# Patient Record
Sex: MALE | Race: WHITE
[De-identification: names, ages, dates, MRNs, and addresses within clinical notes are randomized per-mention and may not be internally consistent; named-entity substitution may affect disease eponyms.]

---

## 2021-04-19 ENCOUNTER — HOSPITAL ENCOUNTER (OUTPATIENT)
Dept: HOSPITAL 79 - NM | Age: 71
End: 2021-04-19
Payer: MEDICARE

## 2021-04-19 DIAGNOSIS — I20.8: ICD-10-CM

## 2021-04-19 DIAGNOSIS — R06.02: Primary | ICD-10-CM

## 2021-04-27 ENCOUNTER — HOSPITAL ENCOUNTER (OUTPATIENT)
Dept: HOSPITAL 79 - NM | Age: 71
End: 2021-04-27
Payer: MEDICARE

## 2021-04-27 DIAGNOSIS — I45.10: ICD-10-CM

## 2021-04-27 DIAGNOSIS — I20.8: Primary | ICD-10-CM

## 2021-04-27 DIAGNOSIS — I51.89: ICD-10-CM

## 2021-04-27 DIAGNOSIS — R94.39: ICD-10-CM

## 2021-04-27 DIAGNOSIS — I49.3: ICD-10-CM

## 2021-04-27 PROCEDURE — A9502 TC99M TETROFOSMIN: HCPCS

## 2021-08-25 ENCOUNTER — HOSPITAL ENCOUNTER (OUTPATIENT)
Dept: HOSPITAL 79 - CATH | Age: 71
Discharge: HOME | End: 2021-08-25
Attending: INTERNAL MEDICINE
Payer: MEDICARE

## 2021-08-25 DIAGNOSIS — I47.2: ICD-10-CM

## 2021-08-25 DIAGNOSIS — E78.5: ICD-10-CM

## 2021-08-25 DIAGNOSIS — Z98.61: ICD-10-CM

## 2021-08-25 DIAGNOSIS — I08.3: ICD-10-CM

## 2021-08-25 DIAGNOSIS — I42.9: ICD-10-CM

## 2021-08-25 DIAGNOSIS — J98.4: ICD-10-CM

## 2021-08-25 DIAGNOSIS — I25.2: ICD-10-CM

## 2021-08-25 DIAGNOSIS — I48.91: ICD-10-CM

## 2021-08-25 DIAGNOSIS — I25.118: Primary | ICD-10-CM

## 2021-08-25 DIAGNOSIS — I50.9: ICD-10-CM

## 2021-08-25 DIAGNOSIS — Z95.1: ICD-10-CM

## 2021-08-25 DIAGNOSIS — Z20.822: ICD-10-CM

## 2021-08-25 DIAGNOSIS — I11.0: ICD-10-CM

## 2021-08-25 DIAGNOSIS — I27.20: ICD-10-CM

## 2021-08-25 PROCEDURE — U0002 COVID-19 LAB TEST NON-CDC: HCPCS

## 2022-06-26 ENCOUNTER — HOSPITAL ENCOUNTER (EMERGENCY)
Dept: HOSPITAL 79 - ER1 | Age: 72
Discharge: HOME | End: 2022-06-26
Payer: MEDICARE

## 2022-06-26 DIAGNOSIS — I10: ICD-10-CM

## 2022-06-26 DIAGNOSIS — Z95.1: ICD-10-CM

## 2022-06-26 DIAGNOSIS — Z87.442: ICD-10-CM

## 2022-06-26 DIAGNOSIS — N13.2: Primary | ICD-10-CM

## 2022-06-26 LAB
BUN/CREATININE RATIO: 20 (ref 0–10)
HGB BLD-MCNC: 16.7 GM/DL (ref 14–17.5)
RED BLOOD COUNT: 5.25 M/UL (ref 4.2–5.5)
WHITE BLOOD COUNT: 8.1 K/UL (ref 4.5–11)

## 2022-06-27 ENCOUNTER — OFFICE VISIT (OUTPATIENT)
Dept: UROLOGY | Facility: CLINIC | Age: 72
End: 2022-06-27

## 2022-06-27 VITALS — HEIGHT: 72 IN | WEIGHT: 185 LBS | BODY MASS INDEX: 25.06 KG/M2

## 2022-06-27 DIAGNOSIS — N21.1 CALCULUS IN URETHRA: Primary | ICD-10-CM

## 2022-06-27 DIAGNOSIS — N20.0 NEPHROLITHIASIS: ICD-10-CM

## 2022-06-27 LAB
BILIRUB BLD-MCNC: NEGATIVE MG/DL
CLARITY, POC: CLEAR
COLOR UR: YELLOW
EXPIRATION DATE: NORMAL
GLUCOSE UR STRIP-MCNC: NEGATIVE MG/DL
KETONES UR QL: NEGATIVE
LEUKOCYTE EST, POC: NEGATIVE
Lab: NORMAL
NITRITE UR-MCNC: NEGATIVE MG/ML
PH UR: 6 [PH] (ref 5–8)
PROT UR STRIP-MCNC: NEGATIVE MG/DL
RBC # UR STRIP: NEGATIVE /UL
SP GR UR: 1.01 (ref 1–1.03)
UROBILINOGEN UR QL: NORMAL

## 2022-06-27 PROCEDURE — 96372 THER/PROPH/DIAG INJ SC/IM: CPT | Performed by: UROLOGY

## 2022-06-27 PROCEDURE — 81003 URINALYSIS AUTO W/O SCOPE: CPT | Performed by: UROLOGY

## 2022-06-27 PROCEDURE — 87086 URINE CULTURE/COLONY COUNT: CPT | Performed by: UROLOGY

## 2022-06-27 PROCEDURE — 99204 OFFICE O/P NEW MOD 45 MIN: CPT | Performed by: UROLOGY

## 2022-06-27 RX ORDER — CEFAZOLIN SODIUM 2 G/50ML
2 SOLUTION INTRAVENOUS ONCE
Status: CANCELLED | OUTPATIENT
Start: 2022-06-27 | End: 2022-06-27

## 2022-06-27 RX ORDER — HYDROCODONE BITARTRATE AND ACETAMINOPHEN 5; 325 MG/1; MG/1
TABLET ORAL
COMMUNITY
Start: 2022-06-26

## 2022-06-27 RX ORDER — IBUPROFEN 800 MG/1
800 TABLET ORAL EVERY 8 HOURS PRN
Qty: 30 TABLET | Refills: 0 | Status: SHIPPED | OUTPATIENT
Start: 2022-06-27

## 2022-06-27 RX ORDER — METOPROLOL SUCCINATE 100 MG/1
100 TABLET, EXTENDED RELEASE ORAL DAILY
COMMUNITY
Start: 2022-05-03

## 2022-06-27 RX ORDER — ASPIRIN 325 MG
325 TABLET ORAL DAILY
COMMUNITY

## 2022-06-27 RX ORDER — KETOROLAC TROMETHAMINE 30 MG/ML
30 INJECTION, SOLUTION INTRAMUSCULAR; INTRAVENOUS ONCE
Status: COMPLETED | OUTPATIENT
Start: 2022-06-27 | End: 2022-06-27

## 2022-06-27 RX ORDER — ATORVASTATIN CALCIUM 40 MG/1
40 TABLET, FILM COATED ORAL DAILY
COMMUNITY
Start: 2022-05-03

## 2022-06-27 RX ORDER — LISINOPRIL 40 MG/1
40 TABLET ORAL DAILY
COMMUNITY
Start: 2022-05-03

## 2022-06-27 RX ORDER — NITROFURANTOIN 25; 75 MG/1; MG/1
CAPSULE ORAL
COMMUNITY
Start: 2022-06-27 | End: 2022-07-05

## 2022-06-27 RX ORDER — AMLODIPINE BESYLATE 5 MG/1
5 TABLET ORAL DAILY
COMMUNITY
Start: 2022-05-03

## 2022-06-27 RX ADMIN — KETOROLAC TROMETHAMINE 30 MG: 30 INJECTION, SOLUTION INTRAMUSCULAR; INTRAVENOUS at 16:04

## 2022-06-27 NOTE — PROGRESS NOTES
Office Visit New Urology      Patient Name: Diallo Chun  : 1950   MRN: [id number]     Chief Complaint:    Chief Complaint   Patient presents with   • Nephrolithiasis       Referring Provider: Referring, Self    History of Present Illness: Diallo Chun is a 71 y.o. male who presents to Urology today for right sided kidney stone.  He reports his pain started last Wednesday and his pain would come and go.  He went to urgent care on Friday and was given some antibiotics.  He then went to the ER yesterday where he had a CT scan that showed a 5 mm stone in the proximal right ureter.  He has had stone before many years ago when he lived in [place].  He was able to pass those stones.  No dysuria or gross hematuria.    No family history of kidney stones.    Today he reports his pain has improved.  He is not having any dysuria or gross hematuria.  No fever, chills, nausea, or vomiting.    Subjective      Review of System: Review of Systems   Constitutional: Positive for fatigue. Negative for chills and fever.   Respiratory: Negative for cough, shortness of breath and wheezing.    Cardiovascular: Negative for leg swelling.   Gastrointestinal: Positive for nausea. Negative for abdominal pain and vomiting.   Genitourinary: Positive for flank pain.   Musculoskeletal: Positive for back pain. Negative for joint swelling.   Neurological: Negative for dizziness and headaches.   Psychiatric/Behavioral: Negative for confusion.      I have reviewed the ROS documented by my clinical staff, I have updated appropriately and I agree. Letty Flores MD    Past Medical History:   Past Medical History:   Diagnosis Date   • Blockage of coronary artery of heart (HCC)    • Hypertension        Past Surgical History:   Past Surgical History:   Procedure Laterality Date   • CARDIAC SURGERY      5 bypass       Family History:   Family History   Problem Relation Age of Onset   • Diabetes Father        Social  "History:   Social History     Socioeconomic History   • Marital status:    Tobacco Use   • Smoking status: Former Smoker   • Smokeless tobacco: Never Used   Vaping Use   • Vaping Use: Never used   Substance and Sexual Activity   • Alcohol use: Never   • Drug use: Never       Medications:     Current Outpatient Medications:   •  aspirin 325 MG tablet, Take 325 mg by mouth Daily., Disp: , Rfl:   •  amLODIPine (NORVASC) 5 MG tablet, Take 5 mg by mouth Daily., Disp: , Rfl:   •  atorvastatin (LIPITOR) 40 MG tablet, Take 40 mg by mouth Daily., Disp: , Rfl:   •  HYDROcodone-acetaminophen (NORCO) 5-325 MG per tablet, , Disp: , Rfl:   •  ibuprofen (ADVIL,MOTRIN) 800 MG tablet, Take 1 tablet by mouth Every 8 (Eight) Hours As Needed for Moderate Pain ., Disp: 30 tablet, Rfl: 0  •  lisinopril (PRINIVIL,ZESTRIL) 40 MG tablet, Take 40 mg by mouth Daily., Disp: , Rfl:   •  metoprolol succinate XL (TOPROL-XL) 100 MG 24 hr tablet, Take 100 mg by mouth Daily., Disp: , Rfl:   •  nitrofurantoin, macrocrystal-monohydrate, (MACROBID) 100 MG capsule, , Disp: , Rfl:     Allergies:   No Known Allergies      Objective     Physical Exam:   Vital Signs:   Vitals:    06/27/22 1519   Weight: 83.9 kg (185 lb)   Height: 182.9 cm (72\")     Body mass index is 25.09 kg/m².     Physical Exam  Vitals and nursing note reviewed.   Constitutional:       General: He is awake. He is not in acute distress.     Appearance: Normal appearance. He is well-developed. He is obese.   HENT:      Head: Normocephalic and atraumatic.      Right Ear: External ear normal.      Left Ear: External ear normal.      Nose: Nose normal.   Eyes:      Conjunctiva/sclera: Conjunctivae normal.   Pulmonary:      Effort: Pulmonary effort is normal.   Abdominal:      General: There is no distension.      Palpations: Abdomen is soft. There is no mass.      Tenderness: There is no abdominal tenderness. There is no right CVA tenderness, left CVA tenderness, guarding or rebound. "      Hernia: No hernia is present. There is no hernia in the left inguinal area or right inguinal area.   Genitourinary:     Pubic Area: No rash.       Penis: Normal.       Testes: Normal.      Prostate: Normal.      Rectum: Normal. No mass or tenderness. Normal anal tone.   Musculoskeletal:      Cervical back: Normal range of motion.   Lymphadenopathy:      Lower Body: No right inguinal adenopathy. No left inguinal adenopathy.   Skin:     General: Skin is warm.   Neurological:      General: No focal deficit present.      Mental Status: He is alert and oriented to person, place, and time.   Psychiatric:         Behavior: Behavior normal. Behavior is cooperative.         Labs:   Brief Urine Lab Results  (Last result in the past 365 days)      Color   Clarity   Blood   Leuk Est   Nitrite   Protein   CREAT   Urine HCG        06/27/22 1538 Yellow   Clear   Negative   Negative   Negative   Negative                      No results found for: GLUCOSE, CALCIUM, NA, K, CO2, CL, BUN, CREATININE, EGFRIFAFRI, EGFRIFNONA, BCR, ANIONGAP    No results found for: WBC, HGB, HCT, MCV, PLT    No results found for: PSA     Images:   No Images in the past 120 days found..    Measures:   Tobacco:   Diallo Chun  reports that he has quit smoking. He has never used smokeless tobacco..    Urine Incontinence: Patient reports that he is not currently experiencing any symptoms of urinary incontinence.      Assessment / Plan      Assessment/Plan:   Diallo Chun is a 71 y.o. male who presented today for right 5 mm proximal ureteral stone.  We were unable to open his imaging today but based on his report he has a 5 mm right proximal stone with hydronephrosis.   I discussed his options with him today which included medical expulsive therapy versus ureteroscopy and laser lithotripsy. I discussed the risks and benefits of each.  His pain has improved some as of today.  He would like to see if he can pass his stone.  We will schedule  him for July 7th for ureteroscopy and laser lithotripsy.  I advised them should he have any fever, nausea, vomiting, worsening or uncontrolled pain he needs to present back to the ER immediately.      Diagnoses and all orders for this visit:    1. Calculus in urethra (Primary)  -     POC Urinalysis Dipstick, Automated  -     Case Request; Standing  -     COVID PRE-OP / PRE-PROCEDURE SCREENING ORDER (NO ISOLATION) - Swab, Nasopharynx; Future  -     Urinalysis With Culture If Indicated -; Future  -     Case Request    2. Nephrolithiasis  -     ibuprofen (ADVIL,MOTRIN) 800 MG tablet; Take 1 tablet by mouth Every 8 (Eight) Hours As Needed for Moderate Pain .  Dispense: 30 tablet; Refill: 0    Other orders  -     Follow Anesthesia Guidelines / Standing Orders; Future  -     Provide NPO Instructions to Patient; Future  -     Chlorhexidine Skin Prep; Future         Patient Education:       Follow Up:   Return for Follow up after surgery.    I spent approximately 45 minutes providing clinical care for this patient; including review of patient's chart and provider documentation, face to face time spent with patient in examination room (obtaining history, performing physical exam, discussing diagnosis and management options), placing orders, and completing patient documentation.     Letty Flores MD  [facility]

## 2022-06-28 ENCOUNTER — TELEPHONE (OUTPATIENT)
Dept: UROLOGY | Facility: CLINIC | Age: 72
End: 2022-06-28

## 2022-06-28 DIAGNOSIS — N20.0 NEPHROLITHIASIS: Primary | ICD-10-CM

## 2022-06-28 LAB — BACTERIA SPEC AEROBE CULT: NO GROWTH

## 2022-06-28 RX ORDER — TAMSULOSIN HYDROCHLORIDE 0.4 MG/1
1 CAPSULE ORAL DAILY
Qty: 30 CAPSULE | Refills: 0 | Status: SHIPPED | OUTPATIENT
Start: 2022-06-28 | End: 2022-11-07

## 2022-06-28 NOTE — TELEPHONE ENCOUNTER
The patient stated he was suppose to have Flomax or one similar called in yesterday but it wasn't. [facility]

## 2022-07-01 NOTE — DISCHARGE INSTRUCTIONS

## 2022-07-05 ENCOUNTER — TELEPHONE (OUTPATIENT)
Dept: UROLOGY | Facility: CLINIC | Age: 72
End: 2022-07-05

## 2022-07-05 ENCOUNTER — PRE-ADMISSION TESTING (OUTPATIENT)
Dept: PREADMISSION TESTING | Facility: HOSPITAL | Age: 72
End: 2022-07-05

## 2022-07-05 ENCOUNTER — LAB (OUTPATIENT)
Dept: LAB | Facility: HOSPITAL | Age: 72
End: 2022-07-05

## 2022-07-05 DIAGNOSIS — N21.1 CALCULUS IN URETHRA: ICD-10-CM

## 2022-07-05 LAB
ANION GAP SERPL CALCULATED.3IONS-SCNC: 11.7 MMOL/L (ref 5–15)
BILIRUB UR QL STRIP: NEGATIVE
BUN SERPL-MCNC: 23 MG/DL (ref 8–23)
BUN/CREAT SERPL: 23.7 (ref 7–25)
CALCIUM SPEC-SCNC: 9.9 MG/DL (ref 8.6–10.5)
CHLORIDE SERPL-SCNC: 103 MMOL/L (ref 98–107)
CLARITY UR: CLEAR
CO2 SERPL-SCNC: 26.3 MMOL/L (ref 22–29)
COLOR UR: YELLOW
CREAT SERPL-MCNC: 0.97 MG/DL (ref 0.76–1.27)
DEPRECATED RDW RBC AUTO: 40.1 FL (ref 37–54)
EGFRCR SERPLBLD CKD-EPI 2021: 84 ML/MIN/1.73
ERYTHROCYTE [DISTWIDTH] IN BLOOD BY AUTOMATED COUNT: 12.3 % (ref 12.3–15.4)
GLUCOSE SERPL-MCNC: 97 MG/DL (ref 65–99)
GLUCOSE UR STRIP-MCNC: NEGATIVE MG/DL
HCT VFR BLD AUTO: 44.7 % (ref 37.5–51)
HGB BLD-MCNC: 15.4 G/DL (ref 13–17.7)
HGB UR QL STRIP.AUTO: NEGATIVE
KETONES UR QL STRIP: NEGATIVE
LEUKOCYTE ESTERASE UR QL STRIP.AUTO: NEGATIVE
MCH RBC QN AUTO: 30.9 PG (ref 26.6–33)
MCHC RBC AUTO-ENTMCNC: 34.5 G/DL (ref 31.5–35.7)
MCV RBC AUTO: 89.6 FL (ref 79–97)
NITRITE UR QL STRIP: NEGATIVE
PH UR STRIP.AUTO: 6 [PH] (ref 5–8)
PLATELET # BLD AUTO: 239 10*3/MM3 (ref 140–450)
PMV BLD AUTO: 10 FL (ref 6–12)
POTASSIUM SERPL-SCNC: 4.4 MMOL/L (ref 3.5–5.2)
PROT UR QL STRIP: NEGATIVE
RBC # BLD AUTO: 4.99 10*6/MM3 (ref 4.14–5.8)
SARS-COV-2 RNA PNL SPEC NAA+PROBE: NOT DETECTED
SODIUM SERPL-SCNC: 141 MMOL/L (ref 136–145)
SP GR UR STRIP: 1.02 (ref 1–1.03)
UROBILINOGEN UR QL STRIP: NORMAL
WBC NRBC COR # BLD: 6.49 10*3/MM3 (ref 3.4–10.8)

## 2022-07-05 PROCEDURE — 36415 COLL VENOUS BLD VENIPUNCTURE: CPT

## 2022-07-05 PROCEDURE — 80048 BASIC METABOLIC PNL TOTAL CA: CPT

## 2022-07-05 PROCEDURE — 85027 COMPLETE CBC AUTOMATED: CPT

## 2022-07-05 PROCEDURE — C9803 HOPD COVID-19 SPEC COLLECT: HCPCS

## 2022-07-05 PROCEDURE — U0004 COV-19 TEST NON-CDC HGH THRU: HCPCS

## 2022-07-05 PROCEDURE — 81003 URINALYSIS AUTO W/O SCOPE: CPT

## 2022-07-05 NOTE — TELEPHONE ENCOUNTER
I called and left the pt a message that if he has not passed the stone that it would eventually flare back up and possibly become obstructing. And to call me back with his decision.

## 2022-07-05 NOTE — TELEPHONE ENCOUNTER
Caller: Diallo Chun    Relationship to patient: Self    Best call back number: [phone]    Patient is needing: PATIENT HAS NOT HAD ANY PAIN FOR 2 DAYS AND HAS NOT HAD TO TAKE ANY PAIN MEDICATION. HE HAS NOT PASSED THE STONE AND HAS BEEN USING THE STRAINER. PATIENT WANTED TO TOUCH BASE AND SEE IF THE PREOP AND SURGERY WAS NECESSARY STILL.     HUB ATTEMPTED TO WARM TRANSFER BUT WAS UNABLE TO REACH PRACTICE.     PATIENT REQUESTING A CALL BACK ON THE MOBILE NUMBER.

## 2022-07-06 ENCOUNTER — ANESTHESIA EVENT (OUTPATIENT)
Dept: PERIOP | Facility: HOSPITAL | Age: 72
End: 2022-07-06

## 2022-07-07 ENCOUNTER — APPOINTMENT (OUTPATIENT)
Dept: GENERAL RADIOLOGY | Facility: HOSPITAL | Age: 72
End: 2022-07-07

## 2022-07-07 ENCOUNTER — HOSPITAL ENCOUNTER (OUTPATIENT)
Facility: HOSPITAL | Age: 72
Discharge: HOME | End: 2022-07-07
Attending: UROLOGY | Admitting: UROLOGY

## 2022-07-07 ENCOUNTER — ANESTHESIA (OUTPATIENT)
Dept: PERIOP | Facility: HOSPITAL | Age: 72
End: 2022-07-07

## 2022-07-07 VITALS
SYSTOLIC BLOOD PRESSURE: 148 MMHG | WEIGHT: 182.2 LBS | DIASTOLIC BLOOD PRESSURE: 80 MMHG | TEMPERATURE: 97.5 F | HEIGHT: 72 IN | OXYGEN SATURATION: 98 % | HEART RATE: 61 BPM | BODY MASS INDEX: 24.68 KG/M2 | RESPIRATION RATE: 18 BRPM

## 2022-07-07 DIAGNOSIS — N21.1 CALCULUS IN URETHRA: ICD-10-CM

## 2022-07-07 PROCEDURE — 25010000002 GENTAMICIN PER 80 MG: Performed by: UROLOGY

## 2022-07-07 PROCEDURE — 76000 FLUOROSCOPY <1 HR PHYS/QHP: CPT | Performed by: RADIOLOGY

## 2022-07-07 PROCEDURE — 82365 CALCULUS SPECTROSCOPY: CPT | Performed by: UROLOGY

## 2022-07-07 PROCEDURE — A9270 NON-COVERED ITEM OR SERVICE: HCPCS | Performed by: UROLOGY

## 2022-07-07 PROCEDURE — C2617 STENT, NON-COR, TEM W/O DEL: HCPCS | Performed by: UROLOGY

## 2022-07-07 PROCEDURE — C1769 GUIDE WIRE: HCPCS | Performed by: UROLOGY

## 2022-07-07 PROCEDURE — 87086 URINE CULTURE/COLONY COUNT: CPT | Performed by: UROLOGY

## 2022-07-07 PROCEDURE — 25010000002 PROPOFOL 10 MG/ML EMULSION: Performed by: NURSE ANESTHETIST, CERTIFIED REGISTERED

## 2022-07-07 PROCEDURE — C2627 CATH, SUPRAPUBIC/CYSTOSCOPIC: HCPCS | Performed by: UROLOGY

## 2022-07-07 PROCEDURE — 99024 POSTOP FOLLOW-UP VISIT: CPT | Performed by: UROLOGY

## 2022-07-07 PROCEDURE — 63710000001 OPIUM-BELLADONNA 16.2-30 MG SUPPOSITORY: Performed by: UROLOGY

## 2022-07-07 PROCEDURE — 76000 FLUOROSCOPY <1 HR PHYS/QHP: CPT

## 2022-07-07 PROCEDURE — 25010000002 FENTANYL CITRATE (PF) 50 MCG/ML SOLUTION: Performed by: NURSE ANESTHETIST, CERTIFIED REGISTERED

## 2022-07-07 PROCEDURE — 25010000002 CEFAZOLIN PER 500 MG: Performed by: UROLOGY

## 2022-07-07 PROCEDURE — 25010000002 MIDAZOLAM PER 1 MG: Performed by: NURSE ANESTHETIST, CERTIFIED REGISTERED

## 2022-07-07 PROCEDURE — 25010000002 IOPAMIDOL 61 % SOLUTION: Performed by: UROLOGY

## 2022-07-07 PROCEDURE — 52356 CYSTO/URETERO W/LITHOTRIPSY: CPT | Performed by: UROLOGY

## 2022-07-07 PROCEDURE — 25010000002 ONDANSETRON PER 1 MG: Performed by: NURSE ANESTHETIST, CERTIFIED REGISTERED

## 2022-07-07 DEVICE — URETERAL STENT
Type: IMPLANTABLE DEVICE | Site: URETER | Status: FUNCTIONAL
Brand: POLARIS™ ULTRA

## 2022-07-07 RX ORDER — CEFADROXIL 500 MG/1
500 CAPSULE ORAL 2 TIMES DAILY
Qty: 10 CAPSULE | Refills: 0 | Status: SHIPPED | OUTPATIENT
Start: 2022-07-07 | End: 2022-07-25

## 2022-07-07 RX ORDER — OXYCODONE HYDROCHLORIDE AND ACETAMINOPHEN 5; 325 MG/1; MG/1
1 TABLET ORAL ONCE AS NEEDED
Status: DISCONTINUED | OUTPATIENT
Start: 2022-07-07 | End: 2022-07-07 | Stop reason: HOSPADM

## 2022-07-07 RX ORDER — MAGNESIUM HYDROXIDE 1200 MG/15ML
LIQUID ORAL AS NEEDED
Status: DISCONTINUED | OUTPATIENT
Start: 2022-07-07 | End: 2022-07-07 | Stop reason: HOSPADM

## 2022-07-07 RX ORDER — ONDANSETRON 2 MG/ML
INJECTION INTRAMUSCULAR; INTRAVENOUS AS NEEDED
Status: DISCONTINUED | OUTPATIENT
Start: 2022-07-07 | End: 2022-07-07 | Stop reason: SURG

## 2022-07-07 RX ORDER — FENTANYL CITRATE 50 UG/ML
INJECTION, SOLUTION INTRAMUSCULAR; INTRAVENOUS AS NEEDED
Status: DISCONTINUED | OUTPATIENT
Start: 2022-07-07 | End: 2022-07-07 | Stop reason: SURG

## 2022-07-07 RX ORDER — MIDAZOLAM HYDROCHLORIDE 1 MG/ML
INJECTION INTRAMUSCULAR; INTRAVENOUS AS NEEDED
Status: DISCONTINUED | OUTPATIENT
Start: 2022-07-07 | End: 2022-07-07 | Stop reason: SURG

## 2022-07-07 RX ORDER — CEFAZOLIN SODIUM 2 G/50ML
2 SOLUTION INTRAVENOUS ONCE
Status: DISCONTINUED | OUTPATIENT
Start: 2022-07-07 | End: 2022-07-07

## 2022-07-07 RX ORDER — SODIUM CHLORIDE 0.9 % (FLUSH) 0.9 %
10 SYRINGE (ML) INJECTION EVERY 12 HOURS SCHEDULED
Status: DISCONTINUED | OUTPATIENT
Start: 2022-07-07 | End: 2022-07-07 | Stop reason: HOSPADM

## 2022-07-07 RX ORDER — SODIUM CHLORIDE, SODIUM LACTATE, POTASSIUM CHLORIDE, CALCIUM CHLORIDE 600; 310; 30; 20 MG/100ML; MG/100ML; MG/100ML; MG/100ML
100 INJECTION, SOLUTION INTRAVENOUS ONCE AS NEEDED
Status: DISCONTINUED | OUTPATIENT
Start: 2022-07-07 | End: 2022-07-07 | Stop reason: HOSPADM

## 2022-07-07 RX ORDER — SODIUM CHLORIDE 0.9 % (FLUSH) 0.9 %
10 SYRINGE (ML) INJECTION AS NEEDED
Status: DISCONTINUED | OUTPATIENT
Start: 2022-07-07 | End: 2022-07-07 | Stop reason: HOSPADM

## 2022-07-07 RX ORDER — IPRATROPIUM BROMIDE AND ALBUTEROL SULFATE 2.5; .5 MG/3ML; MG/3ML
3 SOLUTION RESPIRATORY (INHALATION) ONCE AS NEEDED
Status: DISCONTINUED | OUTPATIENT
Start: 2022-07-07 | End: 2022-07-07 | Stop reason: HOSPADM

## 2022-07-07 RX ORDER — PHENAZOPYRIDINE HYDROCHLORIDE 200 MG/1
200 TABLET, FILM COATED ORAL 3 TIMES DAILY PRN
Qty: 20 TABLET | Refills: 0 | Status: SHIPPED | OUTPATIENT
Start: 2022-07-07

## 2022-07-07 RX ORDER — LIDOCAINE HYDROCHLORIDE 20 MG/ML
INJECTION, SOLUTION INFILTRATION; PERINEURAL AS NEEDED
Status: DISCONTINUED | OUTPATIENT
Start: 2022-07-07 | End: 2022-07-07 | Stop reason: SURG

## 2022-07-07 RX ORDER — SODIUM CHLORIDE, SODIUM LACTATE, POTASSIUM CHLORIDE, CALCIUM CHLORIDE 600; 310; 30; 20 MG/100ML; MG/100ML; MG/100ML; MG/100ML
INJECTION, SOLUTION INTRAVENOUS CONTINUOUS PRN
Status: DISCONTINUED | OUTPATIENT
Start: 2022-07-07 | End: 2022-07-07 | Stop reason: SURG

## 2022-07-07 RX ORDER — GENTAMICIN SULFATE 40 MG/ML
INJECTION, SOLUTION INTRAMUSCULAR; INTRAVENOUS AS NEEDED
Status: DISCONTINUED | OUTPATIENT
Start: 2022-07-07 | End: 2022-07-07 | Stop reason: HOSPADM

## 2022-07-07 RX ORDER — ATROPA BELLADONNA AND OPIUM 16.2; 3 MG/1; MG/1
SUPPOSITORY RECTAL AS NEEDED
Status: DISCONTINUED | OUTPATIENT
Start: 2022-07-07 | End: 2022-07-07 | Stop reason: HOSPADM

## 2022-07-07 RX ORDER — KETOROLAC TROMETHAMINE 30 MG/ML
15 INJECTION, SOLUTION INTRAMUSCULAR; INTRAVENOUS EVERY 6 HOURS PRN
Status: DISCONTINUED | OUTPATIENT
Start: 2022-07-07 | End: 2022-07-07 | Stop reason: HOSPADM

## 2022-07-07 RX ORDER — MEPERIDINE HYDROCHLORIDE 25 MG/ML
12.5 INJECTION INTRAMUSCULAR; INTRAVENOUS; SUBCUTANEOUS
Status: DISCONTINUED | OUTPATIENT
Start: 2022-07-07 | End: 2022-07-07 | Stop reason: HOSPADM

## 2022-07-07 RX ORDER — FAMOTIDINE 10 MG/ML
INJECTION, SOLUTION INTRAVENOUS AS NEEDED
Status: DISCONTINUED | OUTPATIENT
Start: 2022-07-07 | End: 2022-07-07 | Stop reason: SURG

## 2022-07-07 RX ORDER — MIDAZOLAM HYDROCHLORIDE 1 MG/ML
0.5 INJECTION INTRAMUSCULAR; INTRAVENOUS
Status: DISCONTINUED | OUTPATIENT
Start: 2022-07-07 | End: 2022-07-07 | Stop reason: HOSPADM

## 2022-07-07 RX ORDER — FENTANYL CITRATE 50 UG/ML
50 INJECTION, SOLUTION INTRAMUSCULAR; INTRAVENOUS
Status: DISCONTINUED | OUTPATIENT
Start: 2022-07-07 | End: 2022-07-07 | Stop reason: HOSPADM

## 2022-07-07 RX ORDER — ONDANSETRON 2 MG/ML
4 INJECTION INTRAMUSCULAR; INTRAVENOUS AS NEEDED
Status: DISCONTINUED | OUTPATIENT
Start: 2022-07-07 | End: 2022-07-07 | Stop reason: HOSPADM

## 2022-07-07 RX ORDER — EPHEDRINE SULFATE 5 MG/ML
INJECTION INTRAVENOUS AS NEEDED
Status: DISCONTINUED | OUTPATIENT
Start: 2022-07-07 | End: 2022-07-07 | Stop reason: SURG

## 2022-07-07 RX ORDER — PROPOFOL 10 MG/ML
VIAL (ML) INTRAVENOUS AS NEEDED
Status: DISCONTINUED | OUTPATIENT
Start: 2022-07-07 | End: 2022-07-07 | Stop reason: SURG

## 2022-07-07 RX ORDER — SODIUM CHLORIDE, SODIUM LACTATE, POTASSIUM CHLORIDE, CALCIUM CHLORIDE 600; 310; 30; 20 MG/100ML; MG/100ML; MG/100ML; MG/100ML
125 INJECTION, SOLUTION INTRAVENOUS ONCE
Status: COMPLETED | OUTPATIENT
Start: 2022-07-07 | End: 2022-07-07

## 2022-07-07 RX ADMIN — FENTANYL CITRATE 50 MCG: 50 INJECTION INTRAMUSCULAR; INTRAVENOUS at 09:42

## 2022-07-07 RX ADMIN — ONDANSETRON 4 MG: 2 INJECTION INTRAMUSCULAR; INTRAVENOUS at 09:45

## 2022-07-07 RX ADMIN — FAMOTIDINE 20 MG: 10 INJECTION INTRAVENOUS at 08:55

## 2022-07-07 RX ADMIN — SODIUM CHLORIDE, POTASSIUM CHLORIDE, SODIUM LACTATE AND CALCIUM CHLORIDE: 600; 310; 30; 20 INJECTION, SOLUTION INTRAVENOUS at 08:55

## 2022-07-07 RX ADMIN — PROPOFOL 150 MG: 10 INJECTION, EMULSION INTRAVENOUS at 08:58

## 2022-07-07 RX ADMIN — MIDAZOLAM 2 MG: 1 INJECTION INTRAMUSCULAR; INTRAVENOUS at 08:55

## 2022-07-07 RX ADMIN — EPHEDRINE SULFATE 10 MG: 5 INJECTION INTRAVENOUS at 09:24

## 2022-07-07 RX ADMIN — FENTANYL CITRATE 50 MCG: 50 INJECTION INTRAMUSCULAR; INTRAVENOUS at 08:58

## 2022-07-07 RX ADMIN — LIDOCAINE HYDROCHLORIDE 60 MG: 20 INJECTION, SOLUTION INFILTRATION; PERINEURAL at 08:58

## 2022-07-07 RX ADMIN — SODIUM CHLORIDE, POTASSIUM CHLORIDE, SODIUM LACTATE AND CALCIUM CHLORIDE 125 ML/HR: 600; 310; 30; 20 INJECTION, SOLUTION INTRAVENOUS at 07:52

## 2022-07-07 RX ADMIN — EPHEDRINE SULFATE 10 MG: 5 INJECTION INTRAVENOUS at 09:10

## 2022-07-07 RX ADMIN — CEFAZOLIN 2 G: 2 INJECTION, POWDER, FOR SOLUTION INTRAMUSCULAR; INTRAVENOUS at 09:03

## 2022-07-07 NOTE — ANESTHESIA PROCEDURE NOTES
Airway  Urgency: elective    Date/Time: 7/7/2022 8:59 AM    General Information and Staff    Patient location during procedure: OR    Indications and Patient Condition    Preoxygenated: yes  Mask difficulty assessment: 0 - not attempted    Final Airway Details  Final airway type: supraglottic airway      Successful airway: unique  Size 4    Number of attempts at approach: 1  Assessment: lips, teeth, and gum same as pre-op

## 2022-07-07 NOTE — OP NOTE
URETEROSCOPY LASER LITHOTRIPSY WITH STENT INSERTION, CYSTOSCOPY RETROGRADE PYELOGRAM  Procedure Note    Diallo Chun  7/7/2022    Pre-op Diagnosis:   Calculus in urethra [N21.1]    Post-op Diagnosis:     Post-Op Diagnosis Codes:     * Calculus in urethra [N21.1]    Procedure/CPT® Codes:      Procedure(s):  URETEROSCOPY LASER LITHOTRIPSY  CYSTOSCOPY RETROGRADE PYELOGRAM    Surgeon(s):  Letty Flores MD    Anesthesia: see anesthesia record    Staff:   Circulator: Charleen Dougherty RN  Laser Staff: Rimma Bautista  Scrub Person: Maria Esther Carlos LPN    Estimated Blood Loss: minimal  Urine Voided: * No values recorded between 7/7/2022  8:54 AM and 7/7/2022 10:01 AM *    Specimens:                ID Type Source Tests Collected by Time   1 (Not marked as sent) :  Urine Urine, Catheter URINE CULTURE Letty Flores MD 7/7/2022 0907   2 (Not marked as sent) :  Calculus Ureter, Right STONE ANALYSIS Letty Flores MD 7/7/2022 0923         Drains: 5f x 26 cm JJ stent  ON STRING    Findings:   1.  Cystoscopy with no masses or lesions within the walls of the bladder  2.  Retrograde pyelogram shows no hydronephrosis or further filling defect  3.  Successful fragmentation of 5 mm right ureteral stone and basket extraction  4.  Successful placement of 5 Arabic by 26 cm double-J ureteral stent on string    Blood: N/A    Complications: none immediate    Indication for Procedure: Mr. Chun is a 71-year-old gentleman who is here today for definitive stone therapy.  He was seen at an outside ER and found to have a 5 mm proximal ureteral stone on CT.  He was also found to have calcifications in the kidney.  After hearing the risks and benefits of medical expulsive therapy and ureteroscopy like to move forward with ureteroscopy.    Description of Procedure: Patient was seen and examined in the preoperative area.  The risk, benefits, and alternatives were explained to the patient informed consent was obtained.  He was then  taken the operating theater and placed on table in supine position.  Venodyne boots were placed on bilateral lower extremities.  General anesthesia was then induced without any complication.  He was placed in dorsolithotomy position with all pressure points padded and secured.  He was prepped and draped in the usual sterile fashion and a timeout was taken.    A 21 Bengali rigid cystoscope was passed through the urethra and into the bladder.  A cystoscopy was performed in a systematic fashion there were no masses or lesions noted within the walls of the bladder.  Attention was then turned to the right ureteral orifice and a 0.035 sensor wire was passed through the scope into the right renal pelvis under fluoroscopic guidance.  Once in position the scope was withdrawn and a semirigid ureteroscope was then passed through the ureteral orifice into the right ureter.  A stone was encountered distally that was approximately 5 mm.  This was fragmented into small pieces and those pieces were basketed out of the ureter.  The scope was then advanced to the level of the iliacs and we were unable to advance beyond this.      A retrograde pyelogram was then performed to outline the system.  There were no filling defects or hydronephrosis noted.    A 0.035 Glidewire was then passed through the semirigid ureteroscope into the right renal pelvis under fluoroscopic guidance when to position the scope was withdrawn and exchanged for a flexible ureteroscope.  The flexible ureteroscope was then advanced all the way to the renal pelvis and a complete pyeloscopy was performed.  There were multiple River's plaques noted but no free stone.  At this point the scope was withdrawn leaving the 0.035 sensor wire in place.  A 5 Bengali by 26 cm double-J ureteral stent on string was then inserted over a backloaded cystoscope.  Once the stent was in position the wire was pulled and the stent was deployed.  The bladder was drained and he was given a  B&O suppository as well as lidocaine jelly.  The patient tolerated the procedure well and there were no complications to the procedure.  He was taken to PACU in stable and extubated condition.    Disposition: The patient will be discharged home once PACU criteria is met.  He was given prescription for Pyridium as well as Duricef.  He will remove his stent on Monday, 7/11/2022.  I will have him follow-up in 6 weeks.  The intraoperative findings and postoperative plans were discussed with the patient and family.    Letty Flores MD     Date: 7/7/2022  Time: 10:01 EDT

## 2022-07-07 NOTE — ANESTHESIA POSTPROCEDURE EVALUATION
Patient: Diallo Chun    Procedure Summary     Date: 07/07/22 Room / Location: [facility] OR 06 / [facility] OR    Anesthesia Start: 0855 Anesthesia Stop: 1001    Procedures:       URETEROSCOPY LASER LITHOTRIPSY (Right )      CYSTOSCOPY RETROGRADE PYELOGRAM (Right ) Diagnosis:       Calculus in urethra      (Calculus in urethra [N21.1])    Surgeons: Letty Flores MD Provider: Rony Thomas MD    Anesthesia Type: general ASA Status: 3          Anesthesia Type: general    Vitals  No vitals data found for the desired time range.          Post Anesthesia Care and Evaluation    Patient location during evaluation: PHASE II  Patient participation: complete - patient participated  Level of consciousness: awake and alert  Pain score: 1  Pain management: adequate    Airway patency: patent  Anesthetic complications: No anesthetic complications  PONV Status: controlled  Cardiovascular status: acceptable  Respiratory status: acceptable  Hydration status: acceptable

## 2022-07-07 NOTE — ANESTHESIA PREPROCEDURE EVALUATION
Anesthesia Evaluation     no history of anesthetic complications:  NPO Solid Status: > 8 hours  NPO Liquid Status: > 8 hours           Airway   Mallampati: II  TM distance: >3 FB  Neck ROM: full  No difficulty expected  Dental    (+) upper dentures and partials    Pulmonary - normal exam   (+) a smoker Former,   Cardiovascular - normal exam    (+) hypertension, CAD, CABG >6 Months, cardiac stents more than 12 months ago hyperlipidemia,       Neuro/Psych  GI/Hepatic/Renal/Endo    (+)   renal disease stones,     Musculoskeletal     Abdominal  - normal exam   Substance History      OB/GYN          Other                      Anesthesia Plan    ASA 3     general     intravenous induction     Anesthetic plan, risks, benefits, and alternatives have been provided, discussed and informed consent has been obtained with: patient.        CODE STATUS:

## 2022-07-08 LAB — BACTERIA SPEC AEROBE CULT: NO GROWTH

## 2022-07-12 LAB
CALCIUM OXALATE DIHYDRATE MFR STONE IR: 10 %
COLOR STONE: NORMAL
COM MFR STONE: 90 %
COMPN STONE: NORMAL
LABORATORY COMMENT REPORT: NORMAL
Lab: NORMAL
Lab: NORMAL
PHOTO: NORMAL
SIZE STONE: NORMAL MM
SPEC SOURCE SUBJ: NORMAL
WT STONE: 15 MG

## 2022-07-24 DIAGNOSIS — N21.1 CALCULUS IN URETHRA: ICD-10-CM

## 2022-07-25 RX ORDER — CEFADROXIL 500 MG/1
CAPSULE ORAL
Qty: 10 CAPSULE | Refills: 0 | Status: SHIPPED | OUTPATIENT
Start: 2022-07-25

## 2022-11-07 DIAGNOSIS — N20.0 NEPHROLITHIASIS: ICD-10-CM

## 2022-11-07 RX ORDER — TAMSULOSIN HYDROCHLORIDE 0.4 MG/1
CAPSULE ORAL
Qty: 30 CAPSULE | Refills: 0 | Status: SHIPPED | OUTPATIENT
Start: 2022-11-07

## 2023-06-29 PROBLEM — Z95.1 S/P CABG (CORONARY ARTERY BYPASS GRAFT): Status: ACTIVE | Noted: 2023-06-29

## 2023-06-29 PROBLEM — R06.02 SHORTNESS OF BREATH: Status: ACTIVE | Noted: 2023-06-29

## 2023-06-29 PROBLEM — I10 ESSENTIAL HYPERTENSION: Status: ACTIVE | Noted: 2023-06-29

## 2023-06-29 PROBLEM — E78.5 DYSLIPIDEMIA: Status: ACTIVE | Noted: 2023-06-29

## 2023-06-29 PROBLEM — F17.211 CIGARETTE NICOTINE DEPENDENCE IN REMISSION: Status: ACTIVE | Noted: 2023-06-29

## 2023-06-29 PROBLEM — I25.10 CORONARY ARTERY DISEASE INVOLVING NATIVE CORONARY ARTERY OF NATIVE HEART WITHOUT ANGINA PECTORIS: Status: ACTIVE | Noted: 2023-06-29

## 2024-08-30 ENCOUNTER — HOSPITAL ENCOUNTER (OUTPATIENT)
Dept: GENERAL RADIOLOGY | Facility: HOSPITAL | Age: 74
Discharge: HOME | End: 2024-08-30
Payer: MEDICARE

## 2024-08-30 ENCOUNTER — OFFICE VISIT (OUTPATIENT)
Dept: UROLOGY | Facility: CLINIC | Age: 74
End: 2024-08-30
Payer: MEDICARE

## 2024-08-30 VITALS
DIASTOLIC BLOOD PRESSURE: 85 MMHG | HEART RATE: 65 BPM | BODY MASS INDEX: 25.6 KG/M2 | HEIGHT: 72 IN | SYSTOLIC BLOOD PRESSURE: 153 MMHG | WEIGHT: 189 LBS

## 2024-08-30 DIAGNOSIS — R31.0 GROSS HEMATURIA: ICD-10-CM

## 2024-08-30 DIAGNOSIS — Z87.442 HISTORY OF KIDNEY STONES: ICD-10-CM

## 2024-08-30 DIAGNOSIS — R31.29 OTHER MICROSCOPIC HEMATURIA: ICD-10-CM

## 2024-08-30 DIAGNOSIS — N20.0 RIGHT RENAL STONE: Primary | ICD-10-CM

## 2024-08-30 LAB
BILIRUB BLD-MCNC: NEGATIVE MG/DL
CLARITY, POC: CLEAR
COLOR UR: YELLOW
EXPIRATION DATE: ABNORMAL
GLUCOSE UR STRIP-MCNC: NEGATIVE MG/DL
KETONES UR QL: NEGATIVE
LEUKOCYTE EST, POC: NEGATIVE
Lab: ABNORMAL
NITRITE UR-MCNC: NEGATIVE MG/ML
PH UR: 6 [PH] (ref 5–8)
PROT UR STRIP-MCNC: NEGATIVE MG/DL
RBC # UR STRIP: ABNORMAL /UL
SP GR UR: 1 (ref 1–1.03)
UROBILINOGEN UR QL: NORMAL

## 2024-08-30 PROCEDURE — 1159F MED LIST DOCD IN RCRD: CPT | Performed by: NURSE PRACTITIONER

## 2024-08-30 PROCEDURE — 3079F DIAST BP 80-89 MM HG: CPT | Performed by: NURSE PRACTITIONER

## 2024-08-30 PROCEDURE — 81003 URINALYSIS AUTO W/O SCOPE: CPT | Performed by: NURSE PRACTITIONER

## 2024-08-30 PROCEDURE — 87086 URINE CULTURE/COLONY COUNT: CPT | Performed by: NURSE PRACTITIONER

## 2024-08-30 PROCEDURE — 1160F RVW MEDS BY RX/DR IN RCRD: CPT | Performed by: NURSE PRACTITIONER

## 2024-08-30 PROCEDURE — 74018 RADEX ABDOMEN 1 VIEW: CPT

## 2024-08-30 PROCEDURE — 3077F SYST BP >= 140 MM HG: CPT | Performed by: NURSE PRACTITIONER

## 2024-08-30 PROCEDURE — 99214 OFFICE O/P EST MOD 30 MIN: CPT | Performed by: NURSE PRACTITIONER

## 2024-08-30 RX ORDER — ONDANSETRON 4 MG/1
4 TABLET, FILM COATED ORAL EVERY 12 HOURS PRN
Start: 2024-08-30

## 2024-08-30 RX ORDER — TAMSULOSIN HYDROCHLORIDE 0.4 MG/1
0.4 CAPSULE ORAL DAILY
Start: 2024-08-30

## 2024-08-30 RX ORDER — KETOROLAC TROMETHAMINE 10 MG/1
10 TABLET, FILM COATED ORAL EVERY 6 HOURS PRN
Qty: 20 TABLET | Refills: 0 | Status: SHIPPED | OUTPATIENT
Start: 2024-08-30

## 2024-08-30 RX ORDER — TAMSULOSIN HYDROCHLORIDE 0.4 MG/1
0.4 CAPSULE ORAL DAILY
COMMUNITY
Start: 2024-08-20 | End: 2024-08-30 | Stop reason: SDUPTHER

## 2024-08-30 NOTE — PROGRESS NOTES
Chief Complaint  Nephrolithiasis and Gross Hematuria (FOLLOW UP RIGHT  RENAL STONE)    Subjective          Diallo Chun presents to [facility] for GROSS HEMATURIA/BPH WITH LUTS/HX. STONES WITH R RENAL STONE  History of Present Illness   History of Present Illness  The patient is a 76-year-old male with a significant history of kidney stones who returns to the clinic today for evaluation.    He reports concerns of gross hematuria and flank pain consistent with his prior kidney stone disorder. His symptoms started approximately a week ago after extensive use of his lawnmower. He experienced blood in his urine for a few days, which he initially attributed to possible remnants from a previous kidney stone. The blood in his urine was accompanied by a couple of clots and lasted for a few days. He did not experience any burning sensation during urination. The last time he noticed blood in his urine was about a week ago.    On clinical evaluation today, he is in no apparent discomfort but is concerned about kidney stones. He recalls being informed about small fragments that he might pass later, but it took him 2 years to do so.     He was last evaluated in the clinic on 07/07/2022 by Dr. Anthony. He underwent ureteroscopy laser lithotripsy with stent insertion, cystoscopy, and retrograde pyelogram procedures secondary to a 5 mm proximal right ureteral stone with stone basket extraction. His stone analysis showed 90 percent calcium oxalate monohydrate stone with a 10 percent calcium oxalate dihydrate stone. He has done relatively well until recently.    He has an enlarged prostate (BPH) and lower urinary tract symptoms. He is currently on tamsulosin 0.4 mg for his prostate and reports no side effects from his medications. He urinates more frequently than before, with the frequency varying depending on his fluid intake and blood pressure. He does not take any diuretics. He  does not experience any pain while sitting or any lower back pain. He used to suffer from constant lower back pain when he had kidney stones, but this has since resolved.    OVERALL, PATIENT REPORTS THAT He woke up one  morning feeling unwell all over, with chills, pain, and a lack of energy. His skin felt as if it was sunburned. He sought medical attention at an urgent care center where he was tested for COVID-19, influenza, and urinary tract infection (UTI). He was not prescribed any medication at that time. He self-medicated with Tylenol and some old antibiotics he had at home. He feels better today.    He quit smoking in  and underwent a 5-week bypass. He is currently taking aspirin. He does not have diabetes. He has been drinking a lot of water.    FAMILY HISTORY  He denies any family history of prostate cancer, bladder cancer or kidney cancer. He had an uncle who  of throat cancer. His family has a history of diabetes and heart problems.    FINDINGS:KUB 2024 An AP view of the abdomen and pelvis demonstrates an unremarkable bowel gas pattern with no evidence of obstruction. There is a 5 mm calcification overlying the right renal shadow consistent with a stone. Phleboliths are present in the pelvis. Degenerative changes are noted in the lumbar spine.     IMPRESSION:Right nephrolithiasis.     Active Ambulatory Problems     Diagnosis Date Noted    Calculus in urethra 2022    Coronary artery disease involving native coronary artery of native heart without angina pectoris 2023    S/P CABG (coronary artery bypass graft) 2023    Shortness of breath 2023    Essential hypertension 2023    Dyslipidemia 2023    Cigarette nicotine dependence in remission 2023    Gross hematuria 2024    Other microscopic hematuria 2024    History of kidney stones 2024    Right renal stone 2024     Resolved Ambulatory Problems     Diagnosis Date Noted    No  "Resolved Ambulatory Problems     Past Medical History:   Diagnosis Date    Arthritis     Blockage of coronary artery of heart 2003    Elevated cholesterol     GERD (gastroesophageal reflux disease)     History of transfusion     Hyperlipidemia     Hypertension     Kidney stone     Leaky heart valve     Machinery accident       Objective   Vital Signs:   /85 (BP Location: Left arm, Patient Position: Sitting)   Pulse 65   Ht 182.9 cm (72.01\")   Wt 85.7 kg (189 lb)   BMI 25.63 kg/m²       ROS:   Review of Systems   Constitutional:  Positive for activity change, appetite change and unexpected weight loss. Negative for chills, fatigue and fever.   HENT: Negative.  Negative for congestion and sinus pressure.    Eyes: Negative.  Negative for blurred vision and double vision.   Respiratory:  Negative for cough, shortness of breath and wheezing.    Cardiovascular:  Negative for leg swelling.   Gastrointestinal:  Negative for abdominal pain, constipation, diarrhea, nausea and vomiting.   Endocrine: Negative.    Genitourinary:  Positive for flank pain and hematuria. Negative for difficulty urinating, discharge, dysuria, frequency, genital sores, penile pain, penile swelling, scrotal swelling, testicular pain, urgency and urinary incontinence.   Musculoskeletal:  Negative for back pain and joint swelling.   Skin: Negative.    Allergic/Immunologic: Negative.    Neurological:  Negative for dizziness, weakness, headache and confusion.   Hematological: Negative.    Psychiatric/Behavioral:  Positive for sleep disturbance and stress. Negative for agitation, behavioral problems and decreased concentration.         Physical Exam  Constitutional:       General: He is in acute distress.      Appearance: He is well-developed. He is ill-appearing.   HENT:      Head: Normocephalic and atraumatic.      Right Ear: External ear normal.      Left Ear: External ear normal.   Eyes:      General:         Right eye: No discharge.         " Left eye: No discharge.      Conjunctiva/sclera: Conjunctivae normal.      Pupils: Pupils are equal, round, and reactive to light.   Neck:      Thyroid: No thyromegaly.      Trachea: No tracheal deviation.   Cardiovascular:      Rate and Rhythm: Normal rate and regular rhythm.      Heart sounds: No murmur heard.     No friction rub.   Pulmonary:      Effort: Pulmonary effort is normal. No respiratory distress.      Breath sounds: Normal breath sounds. No stridor.   Abdominal:      General: Bowel sounds are normal. There is distension.      Palpations: Abdomen is soft.      Tenderness: There is abdominal tenderness. There is no guarding.   Genitourinary:     Penis: Normal and uncircumcised. No tenderness or discharge.       Testes: Normal.      Rectum: Normal. Guaiac result negative.      Comments: GROSS HEMATURIA  Musculoskeletal:         General: No tenderness or deformity. Normal range of motion.      Cervical back: Normal range of motion and neck supple.   Skin:     General: Skin is warm and dry.      Capillary Refill: Capillary refill takes less than 2 seconds.      Coloration: Skin is not pale.   Neurological:      Mental Status: He is alert and oriented to person, place, and time.      Cranial Nerves: No cranial nerve deficit.      Motor: Weakness present.      Coordination: Coordination normal.   Psychiatric:         Behavior: Behavior normal.         Thought Content: Thought content normal.         Judgment: Judgment normal.        Physical Exam    Result Review :     UA          8/30/2024    12:55   Urinalysis   Ketones, UA Negative    Leukocytes, UA Negative      Urine Culture          8/30/2024    12:59   Urine Culture   Urine Culture No growth  P      Details         P Preliminary result                    Assessment and Plan    Problem List Items Addressed This Visit          Genitourinary and Reproductive     Gross hematuria    Relevant Medications    ketorolac (TORADOL) 10 MG tablet    ondansetron  (Zofran) 4 MG tablet    tamsulosin (FLOMAX) 0.4 MG capsule 24 hr capsule    Other Relevant Orders    XR Abdomen KUB (Completed)    Case Request (Completed)    Other microscopic hematuria    Relevant Medications    ketorolac (TORADOL) 10 MG tablet    ondansetron (Zofran) 4 MG tablet    tamsulosin (FLOMAX) 0.4 MG capsule 24 hr capsule    Other Relevant Orders    POC Urinalysis Dipstick, Automated (Completed)    Urine Culture - Urine, Urine, Clean Catch (Completed)    Case Request (Completed)    History of kidney stones    Relevant Medications    ketorolac (TORADOL) 10 MG tablet    ondansetron (Zofran) 4 MG tablet    tamsulosin (FLOMAX) 0.4 MG capsule 24 hr capsule    Other Relevant Orders    Case Request (Completed)    Right renal stone - Primary    Relevant Medications    ketorolac (TORADOL) 10 MG tablet    ondansetron (Zofran) 4 MG tablet    tamsulosin (FLOMAX) 0.4 MG capsule 24 hr capsule    Other Relevant Orders    Case Request (Completed)     Assessment & Plan      ASSESSMENT  GROSS HEMATURIA/BPH W, LUTS/HX OF KIDNEY STONES-RT RENAL  Mr. Diallo Chun is a pleasant 74-year-old male with significant history of recurrent kidney stones who returns to clinic today for evaluation.  Patient reports concerns of gross hematuria that happened.  1 week ago after riding his lawnmower after extensive hours.  He denies any bothersome urinary symptoms, but has had intermittent urine frequency, urgency but denies dysuria or any burning with urination.  He denies perineal pain consistent with prostatitis, lower back pain, flank pain, he denies any CVA tenderness.  His urinalysis today is negative for leukocyte esterase, negative nitrites, negative gross but show 3+ microscopic hematuria.  Reviewed his KUB which is significant for right renal stone with no ureteral stones noted.    Discussed Microscopic Hematuria with patient. HE  has been Symptomatic for gross hematuria. LAST EPISODE 3 DAYS AGO. We discussed the possible  causes such as infection in the bladder, kidney, or bladder discomfort, trauma, kidney stones, different kinds of cancers, vigorous exercise, viral illness, such as hepatitis a virus that causes liver disease and inflammation of the liver and sexual activity.    THEN, We discussed gross hematuria.  We discussed the presence or absence of the type of clotting identified including vermiform clots consistent with ureteral bleeding versus just pink tinged urine versus jeyson clots.  We discussed the presence of urokinase in the urine which causes the clots to dissolve with time.  We discussed the fact that it takes only a very small amount of blood in the urine to make the urine very red appearing and therefore give one the impression that there is much more blood loss that is really present.     NEXT,  WE Discussed the fact that there is about a 96% chance of a negative workup with episodes of microscopic hematuria and with much greater in the face of Gross hematuria.  We discussed the fact that this is a non-cumulative test.  In other words if there is hematuria next year I would recommend continuing to work up the condition because of the fact that neoplasms may be small at the first workup and easily are missed.   We discussed the fact that if there is any history of chronic kidney disease or risk factors such as diabetes for contrast a noncontrasted study will be utilized.  We will initiate an investigation.      The Patient has been diagnosed with MULTIPLE right renal calculus. We have discussed the various parameters regarding spontaneous passage including the notion that a 2-3 mm stone has a high likelihood of spontaneous passage versus a larger stones>5-6 MM LIKE HE HAS being caught up in the upper areas of the urinary tract.     We also discussed the medical management of stone disease and the use of medical expulsive therapy in the form of Flomax. This is used in an off label setting. I also talked about  nonoperative management including ambulation and increasing fluids and hot tub as being an effective adjuncts in the treatment of a stone. We discussed the absolute relative indicators for intervention including the presence of sepsis, and pain we cannot control as the primary need for urgent intervention.  We discussed placement of a stent if indicated and the management of the stent as well.       ESWL-the patient is a candidate for extracorporeal shockwave lithotripsy.  We discussed the type of stone.  And the complications associated with the procedure including but not limited to pain in the flank, hematoma, spontaneous renal hemorrhage, and adequate fragmentation of stones.  The need for passage of the stones.  The need for concomitant additional procedures in the range of 24% the risk of a distal fragment in the range of 3% requiring ureteroscopy.Patient is not desirous of any surgical intervention at this time. Will continue to monitor.                                                  PLAN  The patient has been scheduled for Right Extracorporeal Shockwave Lithotripsy- ESWL on Friday, September 6 with Dr. Vora.    He will continue tamsulosin 0.4 mg as prescribed also nausea medicine as tolerated.    Toradol 10 mg every 6 hours as needed for moderate pain    We will resend HIS urine for culture, I will call with results if any positive bacterial growth.     We discussed Starting antibiotic therapy if any positive bacteria      We discussed OTHER  treatment options for HIS flank/bACK pain with patient encouraged to continue conservative therapy alternating NSAID/Tylenol as tolerated, Also including hot baths, showers, warm compresses to lower back as tolerated. Also encouraged walking, physical therapy, light exercises as tolerated    Rest is the most important treatment in the case of flank pain. Rest and physical therapy are enough to improve minor pain. Discussed to monitor her daily routine with  prevention of flank pain by: At least Drinking 8 glasses of water per day, Limiting your alcohol consumption.  Having a healthy diet containing fruits, vegetables, and a lot of fluids, Practicing safe sex.  Also maintaining proper hygiene of your body as well as the environment.    Follow up as discussed POST PROCEDURE    HE May return sooner if need be,    GO TO ER IF ANY WORSENING SYMPTOMS     THE PATIENT IS AGREEABLE WITH PLAN OF CARE.      We discussed obtaining a CT urogram for HIS GROSS/Microhematuria if it persists AFTER STONE RESULUTION.     If the patient's microscopic hematuria work-up is negative, per AUA guidelines I would recommend a repeat urinalysis via the patient's primary care provider in 12 months.  If the repeat urinalysis is positive, the patient and I will then need to have a shared decision-making conversation regarding further work-up versus observation, given the low likelihood of identifying etiology in this situation.  If patient were to develop gross hematuria in the interim, the patient would need a total re-evaluation.              1. Gross Hematuria-SUMMARY.  The patient presents with gross hematuria and flank pain, which started approximately a week ago after extensive lawnmower use. Urinalysis shows 3+ microscopic hematuria. Differential diagnoses include bladder infection, kidney stones, bladder tumor, kidney tumor, bladder cyst, kidney cyst, and viral infections such as hepatitis. A KUB x-ray will be performed today to evaluate for kidney stones. If the KUB is negative, a CT scan of the abdomen and pelvis will be conducted to further investigate the source of hematuria. Pending the results of these studies, a definitive plan of care will be established.    2. Benign Prostatic Hyperplasia (BPH).  The patient has a history of BPH and is currently on tamsulosin 0.4 mg. His most recent PSA is 4.3, up from 2.4 the previous year. He reports no side effects from his medication. He is  advised to continue taking tamsulosin 0.4 mg as prescribed.    3. History of Kidney Stones.  The patient has a significant history of kidney stones, with the last episode treated on 07/07/2022. He underwent ureteroscopy laser lithotripsy with stent insertion and stone basket extraction. Stone analysis showed 90% calcium oxalate monohydrate and 10% calcium oxalate dihydrate. He has been asymptomatic until the recent episode of hematuria. Imaging studies (KUB and potentially CT) will help determine if new stones have formed or if other issues are present.    Patient reports that he is not currently experiencing any symptoms of urinary incontinence.    BMI is within normal parameters. No other follow-up for BMI required.    RADIOLOGY (CT AND/OR KUB):    CT Abdomen and Pelvis: No results found for this or any previous visit.     CT Stone Protocol: No results found for this or any previous visit.     KUB: No results found for this or any previous visit.     [unfilled]  LABS (3 MONTHS):    Office Visit on 08/30/2024   Component Date Value Ref Range Status    Color 08/30/2024 Yellow  Yellow, Straw, Dark Yellow, Karen Final    Clarity, UA 08/30/2024 Clear  Clear Final    Specific Gravity  08/30/2024 1.005  1.005 - 1.030 Final    pH, Urine 08/30/2024 6.0  5.0 - 8.0 Final    Leukocytes 08/30/2024 Negative  Negative Final    Nitrite, UA 08/30/2024 Negative  Negative Final    Protein, POC 08/30/2024 Negative  Negative mg/dL Final    Glucose, UA 08/30/2024 Negative  Negative mg/dL Final    Ketones, UA 08/30/2024 Negative  Negative Final    Urobilinogen, UA 08/30/2024 Normal  Normal, 0.2 E.U./dL Final    Bilirubin 08/30/2024 Negative  Negative Final    Blood, UA 08/30/2024 3+ (A)  Negative Final    Lot Number 08/30/2024 [id number]   Final    Expiration Date 08/30/2024 102,024   Final    Urine Culture 08/30/2024 No growth   Preliminary        Follow Up   Return in about 1 week (around 9/6/2024) for Next scheduled follow up,  With Dr. Vora IN OR FOR RIGHT ESWL/7MM RT RENAL STONE/FLANK PAIN.    Patient was given instructions and counseling regarding his condition or for health maintenance advice. Please see specific information pulled into the AVS if appropriate.          This document has been electronically signed by Griselda Cheng-Akwa, APRN   August 31, 2024 17:17 EDT      Dictated Utilizing Dragon Dictation: Part of this note may be an electronic transcription/translation of spoken language to printed text using the Dragon Dictation System.      Patient or patient representative verbalized consent for the use of Ambient Listening during the visit with  Griselda Cheng-Akwa, APRN for chart documentation. 8/31/2024  17:17 EDT

## 2024-08-30 NOTE — H&P (VIEW-ONLY)
Chief Complaint  Nephrolithiasis and Gross Hematuria (FOLLOW UP RIGHT  RENAL STONE)    Subjective          Diallo Chun presents to [facility] for GROSS HEMATURIA/BPH WITH LUTS/HX. STONES WITH R RENAL STONE  History of Present Illness   History of Present Illness  The patient is a 76-year-old male with a significant history of kidney stones who returns to the clinic today for evaluation.    He reports concerns of gross hematuria and flank pain consistent with his prior kidney stone disorder. His symptoms started approximately a week ago after extensive use of his lawnmower. He experienced blood in his urine for a few days, which he initially attributed to possible remnants from a previous kidney stone. The blood in his urine was accompanied by a couple of clots and lasted for a few days. He did not experience any burning sensation during urination. The last time he noticed blood in his urine was about a week ago.    On clinical evaluation today, he is in no apparent discomfort but is concerned about kidney stones. He recalls being informed about small fragments that he might pass later, but it took him 2 years to do so.     He was last evaluated in the clinic on 07/07/2022 by Dr. Anthony. He underwent ureteroscopy laser lithotripsy with stent insertion, cystoscopy, and retrograde pyelogram procedures secondary to a 5 mm proximal right ureteral stone with stone basket extraction. His stone analysis showed 90 percent calcium oxalate monohydrate stone with a 10 percent calcium oxalate dihydrate stone. He has done relatively well until recently.    He has an enlarged prostate (BPH) and lower urinary tract symptoms. He is currently on tamsulosin 0.4 mg for his prostate and reports no side effects from his medications. He urinates more frequently than before, with the frequency varying depending on his fluid intake and blood pressure. He does not take any diuretics. He  does not experience any pain while sitting or any lower back pain. He used to suffer from constant lower back pain when he had kidney stones, but this has since resolved.    OVERALL, PATIENT REPORTS THAT He woke up one  morning feeling unwell all over, with chills, pain, and a lack of energy. His skin felt as if it was sunburned. He sought medical attention at an urgent care center where he was tested for COVID-19, influenza, and urinary tract infection (UTI). He was not prescribed any medication at that time. He self-medicated with Tylenol and some old antibiotics he had at home. He feels better today.    He quit smoking in  and underwent a 5-week bypass. He is currently taking aspirin. He does not have diabetes. He has been drinking a lot of water.    FAMILY HISTORY  He denies any family history of prostate cancer, bladder cancer or kidney cancer. He had an uncle who  of throat cancer. His family has a history of diabetes and heart problems.    FINDINGS:KUB 2024 An AP view of the abdomen and pelvis demonstrates an unremarkable bowel gas pattern with no evidence of obstruction. There is a 5 mm calcification overlying the right renal shadow consistent with a stone. Phleboliths are present in the pelvis. Degenerative changes are noted in the lumbar spine.     IMPRESSION:Right nephrolithiasis.     Active Ambulatory Problems     Diagnosis Date Noted    Calculus in urethra 2022    Coronary artery disease involving native coronary artery of native heart without angina pectoris 2023    S/P CABG (coronary artery bypass graft) 2023    Shortness of breath 2023    Essential hypertension 2023    Dyslipidemia 2023    Cigarette nicotine dependence in remission 2023    Gross hematuria 2024    Other microscopic hematuria 2024    History of kidney stones 2024    Right renal stone 2024     Resolved Ambulatory Problems     Diagnosis Date Noted    No  "Resolved Ambulatory Problems     Past Medical History:   Diagnosis Date    Arthritis     Blockage of coronary artery of heart 2003    Elevated cholesterol     GERD (gastroesophageal reflux disease)     History of transfusion     Hyperlipidemia     Hypertension     Kidney stone     Leaky heart valve     Machinery accident       Objective   Vital Signs:   /85 (BP Location: Left arm, Patient Position: Sitting)   Pulse 65   Ht 182.9 cm (72.01\")   Wt 85.7 kg (189 lb)   BMI 25.63 kg/m²       ROS:   Review of Systems   Constitutional:  Positive for activity change, appetite change and unexpected weight loss. Negative for chills, fatigue and fever.   HENT: Negative.  Negative for congestion and sinus pressure.    Eyes: Negative.  Negative for blurred vision and double vision.   Respiratory:  Negative for cough, shortness of breath and wheezing.    Cardiovascular:  Negative for leg swelling.   Gastrointestinal:  Negative for abdominal pain, constipation, diarrhea, nausea and vomiting.   Endocrine: Negative.    Genitourinary:  Positive for flank pain and hematuria. Negative for difficulty urinating, discharge, dysuria, frequency, genital sores, penile pain, penile swelling, scrotal swelling, testicular pain, urgency and urinary incontinence.   Musculoskeletal:  Negative for back pain and joint swelling.   Skin: Negative.    Allergic/Immunologic: Negative.    Neurological:  Negative for dizziness, weakness, headache and confusion.   Hematological: Negative.    Psychiatric/Behavioral:  Positive for sleep disturbance and stress. Negative for agitation, behavioral problems and decreased concentration.         Physical Exam  Constitutional:       General: He is in acute distress.      Appearance: He is well-developed. He is ill-appearing.   HENT:      Head: Normocephalic and atraumatic.      Right Ear: External ear normal.      Left Ear: External ear normal.   Eyes:      General:         Right eye: No discharge.         " Left eye: No discharge.      Conjunctiva/sclera: Conjunctivae normal.      Pupils: Pupils are equal, round, and reactive to light.   Neck:      Thyroid: No thyromegaly.      Trachea: No tracheal deviation.   Cardiovascular:      Rate and Rhythm: Normal rate and regular rhythm.      Heart sounds: No murmur heard.     No friction rub.   Pulmonary:      Effort: Pulmonary effort is normal. No respiratory distress.      Breath sounds: Normal breath sounds. No stridor.   Abdominal:      General: Bowel sounds are normal. There is distension.      Palpations: Abdomen is soft.      Tenderness: There is abdominal tenderness. There is no guarding.   Genitourinary:     Penis: Normal and uncircumcised. No tenderness or discharge.       Testes: Normal.      Rectum: Normal. Guaiac result negative.      Comments: GROSS HEMATURIA  Musculoskeletal:         General: No tenderness or deformity. Normal range of motion.      Cervical back: Normal range of motion and neck supple.   Skin:     General: Skin is warm and dry.      Capillary Refill: Capillary refill takes less than 2 seconds.      Coloration: Skin is not pale.   Neurological:      Mental Status: He is alert and oriented to person, place, and time.      Cranial Nerves: No cranial nerve deficit.      Motor: Weakness present.      Coordination: Coordination normal.   Psychiatric:         Behavior: Behavior normal.         Thought Content: Thought content normal.         Judgment: Judgment normal.        Physical Exam    Result Review :     UA          8/30/2024    12:55   Urinalysis   Ketones, UA Negative    Leukocytes, UA Negative      Urine Culture          8/30/2024    12:59   Urine Culture   Urine Culture No growth  P      Details         P Preliminary result                    Assessment and Plan    Problem List Items Addressed This Visit          Genitourinary and Reproductive     Gross hematuria    Relevant Medications    ketorolac (TORADOL) 10 MG tablet    ondansetron  (Zofran) 4 MG tablet    tamsulosin (FLOMAX) 0.4 MG capsule 24 hr capsule    Other Relevant Orders    XR Abdomen KUB (Completed)    Case Request (Completed)    Other microscopic hematuria    Relevant Medications    ketorolac (TORADOL) 10 MG tablet    ondansetron (Zofran) 4 MG tablet    tamsulosin (FLOMAX) 0.4 MG capsule 24 hr capsule    Other Relevant Orders    POC Urinalysis Dipstick, Automated (Completed)    Urine Culture - Urine, Urine, Clean Catch (Completed)    Case Request (Completed)    History of kidney stones    Relevant Medications    ketorolac (TORADOL) 10 MG tablet    ondansetron (Zofran) 4 MG tablet    tamsulosin (FLOMAX) 0.4 MG capsule 24 hr capsule    Other Relevant Orders    Case Request (Completed)    Right renal stone - Primary    Relevant Medications    ketorolac (TORADOL) 10 MG tablet    ondansetron (Zofran) 4 MG tablet    tamsulosin (FLOMAX) 0.4 MG capsule 24 hr capsule    Other Relevant Orders    Case Request (Completed)     Assessment & Plan      ASSESSMENT  GROSS HEMATURIA/BPH W, LUTS/HX OF KIDNEY STONES-RT RENAL  Mr. Diallo Chun is a pleasant 74-year-old male with significant history of recurrent kidney stones who returns to clinic today for evaluation.  Patient reports concerns of gross hematuria that happened.  1 week ago after riding his lawnmower after extensive hours.  He denies any bothersome urinary symptoms, but has had intermittent urine frequency, urgency but denies dysuria or any burning with urination.  He denies perineal pain consistent with prostatitis, lower back pain, flank pain, he denies any CVA tenderness.  His urinalysis today is negative for leukocyte esterase, negative nitrites, negative gross but show 3+ microscopic hematuria.  Reviewed his KUB which is significant for right renal stone with no ureteral stones noted.    Discussed Microscopic Hematuria with patient. HE  has been Symptomatic for gross hematuria. LAST EPISODE 3 DAYS AGO. We discussed the possible  causes such as infection in the bladder, kidney, or bladder discomfort, trauma, kidney stones, different kinds of cancers, vigorous exercise, viral illness, such as hepatitis a virus that causes liver disease and inflammation of the liver and sexual activity.    THEN, We discussed gross hematuria.  We discussed the presence or absence of the type of clotting identified including vermiform clots consistent with ureteral bleeding versus just pink tinged urine versus jeyson clots.  We discussed the presence of urokinase in the urine which causes the clots to dissolve with time.  We discussed the fact that it takes only a very small amount of blood in the urine to make the urine very red appearing and therefore give one the impression that there is much more blood loss that is really present.     NEXT,  WE Discussed the fact that there is about a 96% chance of a negative workup with episodes of microscopic hematuria and with much greater in the face of Gross hematuria.  We discussed the fact that this is a non-cumulative test.  In other words if there is hematuria next year I would recommend continuing to work up the condition because of the fact that neoplasms may be small at the first workup and easily are missed.   We discussed the fact that if there is any history of chronic kidney disease or risk factors such as diabetes for contrast a noncontrasted study will be utilized.  We will initiate an investigation.      The Patient has been diagnosed with MULTIPLE right renal calculus. We have discussed the various parameters regarding spontaneous passage including the notion that a 2-3 mm stone has a high likelihood of spontaneous passage versus a larger stones>5-6 MM LIKE HE HAS being caught up in the upper areas of the urinary tract.     We also discussed the medical management of stone disease and the use of medical expulsive therapy in the form of Flomax. This is used in an off label setting. I also talked about  nonoperative management including ambulation and increasing fluids and hot tub as being an effective adjuncts in the treatment of a stone. We discussed the absolute relative indicators for intervention including the presence of sepsis, and pain we cannot control as the primary need for urgent intervention.  We discussed placement of a stent if indicated and the management of the stent as well.       ESWL-the patient is a candidate for extracorporeal shockwave lithotripsy.  We discussed the type of stone.  And the complications associated with the procedure including but not limited to pain in the flank, hematoma, spontaneous renal hemorrhage, and adequate fragmentation of stones.  The need for passage of the stones.  The need for concomitant additional procedures in the range of 24% the risk of a distal fragment in the range of 3% requiring ureteroscopy.Patient is not desirous of any surgical intervention at this time. Will continue to monitor.                                                  PLAN  The patient has been scheduled for Right Extracorporeal Shockwave Lithotripsy- ESWL on Friday, September 6 with Dr. Vora.    He will continue tamsulosin 0.4 mg as prescribed also nausea medicine as tolerated.    Toradol 10 mg every 6 hours as needed for moderate pain    We will resend HIS urine for culture, I will call with results if any positive bacterial growth.     We discussed Starting antibiotic therapy if any positive bacteria      We discussed OTHER  treatment options for HIS flank/bACK pain with patient encouraged to continue conservative therapy alternating NSAID/Tylenol as tolerated, Also including hot baths, showers, warm compresses to lower back as tolerated. Also encouraged walking, physical therapy, light exercises as tolerated    Rest is the most important treatment in the case of flank pain. Rest and physical therapy are enough to improve minor pain. Discussed to monitor her daily routine with  prevention of flank pain by: At least Drinking 8 glasses of water per day, Limiting your alcohol consumption.  Having a healthy diet containing fruits, vegetables, and a lot of fluids, Practicing safe sex.  Also maintaining proper hygiene of your body as well as the environment.    Follow up as discussed POST PROCEDURE    HE May return sooner if need be,    GO TO ER IF ANY WORSENING SYMPTOMS     THE PATIENT IS AGREEABLE WITH PLAN OF CARE.      We discussed obtaining a CT urogram for HIS GROSS/Microhematuria if it persists AFTER STONE RESULUTION.     If the patient's microscopic hematuria work-up is negative, per AUA guidelines I would recommend a repeat urinalysis via the patient's primary care provider in 12 months.  If the repeat urinalysis is positive, the patient and I will then need to have a shared decision-making conversation regarding further work-up versus observation, given the low likelihood of identifying etiology in this situation.  If patient were to develop gross hematuria in the interim, the patient would need a total re-evaluation.              1. Gross Hematuria-SUMMARY.  The patient presents with gross hematuria and flank pain, which started approximately a week ago after extensive lawnmower use. Urinalysis shows 3+ microscopic hematuria. Differential diagnoses include bladder infection, kidney stones, bladder tumor, kidney tumor, bladder cyst, kidney cyst, and viral infections such as hepatitis. A KUB x-ray will be performed today to evaluate for kidney stones. If the KUB is negative, a CT scan of the abdomen and pelvis will be conducted to further investigate the source of hematuria. Pending the results of these studies, a definitive plan of care will be established.    2. Benign Prostatic Hyperplasia (BPH).  The patient has a history of BPH and is currently on tamsulosin 0.4 mg. His most recent PSA is 4.3, up from 2.4 the previous year. He reports no side effects from his medication. He is  advised to continue taking tamsulosin 0.4 mg as prescribed.    3. History of Kidney Stones.  The patient has a significant history of kidney stones, with the last episode treated on 07/07/2022. He underwent ureteroscopy laser lithotripsy with stent insertion and stone basket extraction. Stone analysis showed 90% calcium oxalate monohydrate and 10% calcium oxalate dihydrate. He has been asymptomatic until the recent episode of hematuria. Imaging studies (KUB and potentially CT) will help determine if new stones have formed or if other issues are present.    Patient reports that he is not currently experiencing any symptoms of urinary incontinence.    BMI is within normal parameters. No other follow-up for BMI required.    RADIOLOGY (CT AND/OR KUB):    CT Abdomen and Pelvis: No results found for this or any previous visit.     CT Stone Protocol: No results found for this or any previous visit.     KUB: No results found for this or any previous visit.     [unfilled]  LABS (3 MONTHS):    Office Visit on 08/30/2024   Component Date Value Ref Range Status    Color 08/30/2024 Yellow  Yellow, Straw, Dark Yellow, Karen Final    Clarity, UA 08/30/2024 Clear  Clear Final    Specific Gravity  08/30/2024 1.005  1.005 - 1.030 Final    pH, Urine 08/30/2024 6.0  5.0 - 8.0 Final    Leukocytes 08/30/2024 Negative  Negative Final    Nitrite, UA 08/30/2024 Negative  Negative Final    Protein, POC 08/30/2024 Negative  Negative mg/dL Final    Glucose, UA 08/30/2024 Negative  Negative mg/dL Final    Ketones, UA 08/30/2024 Negative  Negative Final    Urobilinogen, UA 08/30/2024 Normal  Normal, 0.2 E.U./dL Final    Bilirubin 08/30/2024 Negative  Negative Final    Blood, UA 08/30/2024 3+ (A)  Negative Final    Lot Number 08/30/2024 [id number]   Final    Expiration Date 08/30/2024 102,024   Final    Urine Culture 08/30/2024 No growth   Preliminary        Follow Up   Return in about 1 week (around 9/6/2024) for Next scheduled follow up,  With Dr. Vora IN OR FOR RIGHT ESWL/7MM RT RENAL STONE/FLANK PAIN.    Patient was given instructions and counseling regarding his condition or for health maintenance advice. Please see specific information pulled into the AVS if appropriate.          This document has been electronically signed by Griselda Cheng-Akwa, APRN   August 31, 2024 17:17 EDT      Dictated Utilizing Dragon Dictation: Part of this note may be an electronic transcription/translation of spoken language to printed text using the Dragon Dictation System.      Patient or patient representative verbalized consent for the use of Ambient Listening during the visit with  Griselda Cheng-Akwa, APRN for chart documentation. 8/31/2024  17:17 EDT

## 2024-09-01 LAB — BACTERIA SPEC AEROBE CULT: NO GROWTH

## 2024-09-03 NOTE — DISCHARGE INSTRUCTIONS
9/6/24  ARRIVAL TIME PER DR OFFICE  TAKE the following medications the morning of surgery:  All heart or blood pressure medications    HOLD all diabetic medications the morning of surgery as ordered by physician.    Please discontinue all blood thinners and anticoagulants (except aspirin) prior to surgery as per your surgeon and cardiologist instructions.  Aspirin may be continued up to the day prior to surgery.       General Instructions:  Do not eat or drink after midnight: includes water, mints, or gum. You may brush your teeth.  Dental appliances that are removable must be taken out day of surgery.  Do not smoke, chew tobacco, or drink alcohol.  Bring medications in original bottles, any inhalers and if applicable your C-PAP/BI-PAP machine.  Bring any papers given to you in the doctor's office.  Wear clean comfortable clothes and socks.  Do not wear contact lenses or make-up. Bring a case for your glasses if applicable.  Bring crutches or walker if applicable.  Leave all other valuables and jewelry at home.    If you were given a blood bank ID arm band remember to bring it with you the day of surgery.    Preventing a Surgical Site Infection:  Shower the night before surgery (unless instructed other wise) using a fresh bar of anti-bacterial soap (such as Dial) and clean washcloth. Dry with a clean towel and dress in clean clothing.  For 2 to 3 days before surgery, avoid shaving with a razor near where you will have surgery because the razor can irritate skin and make it easier to develop an infection. Ask your surgeon if you will be receiving antibiotics prior to surgery.  Make sure you, your family, and all healthcare providers clear their hands with soap and water or an alcohol-based hand  before caring for you or your wound.  If at all possible, quit smoking as many days before surgery as you can.    Day of surgery:  Upon arrival, a Pre-op nurse and Anesthesiologist will review your health history,  obtain vital signs, and answer questions you may have. The only belongings needed at this time will be your home medications and if applicable your C-PAP/BI-PAP machine. If you are staying overnight your family can leave the rest of your belongings in the car and bring them to your room later. A Pre-op nurse will start an IV and you may receive medication in preparation for surgery, including something to help you relax. Your family will be able to see you in the Pre-op area. While you are in surgery your family should notify the waiting room  if they leave the waiting room area and provide a contact phone number.    Please be aware that surgery does come with discomfort. We want to make every effort to control your discomfort so please discuss any uncontrolled symptoms with your nurse. Your doctor will most likely have prescribed pain medications.    If you are going home after surgery you will receive individualized written care instructions before being discharged. A responsible adult must drive you to and from the hospital on the day of surgery and stay with you for 24 hours.    If you are staying overnight following surgery, you will be transported to your hospital room following the recovery period.     If you have any questions please call Pre-Admission Testing at [phone] Ext [phone] Monday-Friday 8:00-3:00  Deductibles and co-payments are collected on the day of service. Please be prepared to pay the required co-pay, deductible or deposit on the day of service as defined by your plan.    A RESPONSIBLE PERSON MUST REMAIN IN THE WAITING ROOM DURING YOUR PROCEDURE AND A RESPONSIBLE  MUST BE AVAILABLE UPON YOUR DISCHARGE.

## 2024-09-04 ENCOUNTER — PRE-ADMISSION TESTING (OUTPATIENT)
Dept: PREADMISSION TESTING | Facility: HOSPITAL | Age: 74
End: 2024-09-04
Payer: MEDICARE

## 2024-09-04 ENCOUNTER — TELEPHONE (OUTPATIENT)
Dept: UROLOGY | Facility: CLINIC | Age: 74
End: 2024-09-04
Payer: MEDICARE

## 2024-09-04 LAB
QT INTERVAL: 460 MS
QTC INTERVAL: 466 MS

## 2024-09-04 PROCEDURE — 93005 ELECTROCARDIOGRAM TRACING: CPT

## 2024-09-04 NOTE — TELEPHONE ENCOUNTER
I called pt with negative urine culture results I left voicemail.    ----- Message from Griselda Cheng-Akwa sent at 9/3/2024  8:13 PM EDT -----  Please kindly let patient know her urine culture results are negative for any bacterial infection at this time.     Urine Culture -No growth     However she may drop off another urine sample each time she feels symptomatic.     We will ONLY continue antibiotic suppressive therapy taking 1 p.o. nightly if indicated     If not increase p.o. fluid intake and follow-up in clinic as discussed.     Thank you

## 2024-09-06 ENCOUNTER — HOSPITAL ENCOUNTER (OUTPATIENT)
Facility: HOSPITAL | Age: 74
Setting detail: HOSPITAL OUTPATIENT SURGERY
Discharge: HOME | End: 2024-09-06
Attending: UROLOGY | Admitting: UROLOGY
Payer: MEDICARE

## 2024-09-06 ENCOUNTER — ANESTHESIA EVENT (OUTPATIENT)
Dept: PERIOP | Facility: HOSPITAL | Age: 74
End: 2024-09-06
Payer: MEDICARE

## 2024-09-06 ENCOUNTER — ANESTHESIA (OUTPATIENT)
Dept: PERIOP | Facility: HOSPITAL | Age: 74
End: 2024-09-06
Payer: MEDICARE

## 2024-09-06 VITALS
WEIGHT: 183 LBS | RESPIRATION RATE: 18 BRPM | SYSTOLIC BLOOD PRESSURE: 124 MMHG | BODY MASS INDEX: 24.79 KG/M2 | HEART RATE: 71 BPM | HEIGHT: 72 IN | DIASTOLIC BLOOD PRESSURE: 68 MMHG | TEMPERATURE: 97.7 F | OXYGEN SATURATION: 97 %

## 2024-09-06 DIAGNOSIS — R31.0 GROSS HEMATURIA: ICD-10-CM

## 2024-09-06 DIAGNOSIS — R31.29 OTHER MICROSCOPIC HEMATURIA: ICD-10-CM

## 2024-09-06 DIAGNOSIS — N20.0 RIGHT RENAL STONE: ICD-10-CM

## 2024-09-06 DIAGNOSIS — Z87.442 HISTORY OF KIDNEY STONES: ICD-10-CM

## 2024-09-06 PROCEDURE — 25010000002 FENTANYL CITRATE (PF) 50 MCG/ML SOLUTION: Performed by: NURSE ANESTHETIST, CERTIFIED REGISTERED

## 2024-09-06 PROCEDURE — 25010000002 GENTAMICIN PER 80 MG: Performed by: UROLOGY

## 2024-09-06 PROCEDURE — 25010000002 ONDANSETRON PER 1 MG: Performed by: NURSE ANESTHETIST, CERTIFIED REGISTERED

## 2024-09-06 PROCEDURE — 25010000002 PHENYLEPHRINE 10 MG/ML SOLUTION: Performed by: NURSE ANESTHETIST, CERTIFIED REGISTERED

## 2024-09-06 PROCEDURE — 25810000003 LACTATED RINGERS PER 1000 ML: Performed by: ANESTHESIOLOGY

## 2024-09-06 PROCEDURE — 25010000002 PROPOFOL 200 MG/20ML EMULSION: Performed by: NURSE ANESTHETIST, CERTIFIED REGISTERED

## 2024-09-06 PROCEDURE — 25010000002 KETOROLAC TROMETHAMINE PER 15 MG: Performed by: NURSE ANESTHETIST, CERTIFIED REGISTERED

## 2024-09-06 PROCEDURE — 50590 FRAGMENTING OF KIDNEY STONE: CPT | Performed by: UROLOGY

## 2024-09-06 RX ORDER — KETOROLAC TROMETHAMINE 30 MG/ML
INJECTION, SOLUTION INTRAMUSCULAR; INTRAVENOUS AS NEEDED
Status: DISCONTINUED | OUTPATIENT
Start: 2024-09-06 | End: 2024-09-06 | Stop reason: SURG

## 2024-09-06 RX ORDER — EPHEDRINE SULFATE 5 MG/ML
INJECTION INTRAVENOUS AS NEEDED
Status: DISCONTINUED | OUTPATIENT
Start: 2024-09-06 | End: 2024-09-06 | Stop reason: SURG

## 2024-09-06 RX ORDER — FENTANYL CITRATE 50 UG/ML
50 INJECTION, SOLUTION INTRAMUSCULAR; INTRAVENOUS
Status: DISCONTINUED | OUTPATIENT
Start: 2024-09-06 | End: 2024-09-06 | Stop reason: HOSPADM

## 2024-09-06 RX ORDER — IPRATROPIUM BROMIDE AND ALBUTEROL SULFATE 2.5; .5 MG/3ML; MG/3ML
3 SOLUTION RESPIRATORY (INHALATION) ONCE AS NEEDED
Status: DISCONTINUED | OUTPATIENT
Start: 2024-09-06 | End: 2024-09-06 | Stop reason: HOSPADM

## 2024-09-06 RX ORDER — SODIUM CHLORIDE 0.9 % (FLUSH) 0.9 %
10 SYRINGE (ML) INJECTION AS NEEDED
Status: DISCONTINUED | OUTPATIENT
Start: 2024-09-06 | End: 2024-09-06 | Stop reason: HOSPADM

## 2024-09-06 RX ORDER — ONDANSETRON 2 MG/ML
4 INJECTION INTRAMUSCULAR; INTRAVENOUS AS NEEDED
Status: DISCONTINUED | OUTPATIENT
Start: 2024-09-06 | End: 2024-09-06 | Stop reason: HOSPADM

## 2024-09-06 RX ORDER — MEPERIDINE HYDROCHLORIDE 25 MG/ML
12.5 INJECTION INTRAMUSCULAR; INTRAVENOUS; SUBCUTANEOUS
Status: DISCONTINUED | OUTPATIENT
Start: 2024-09-06 | End: 2024-09-06 | Stop reason: HOSPADM

## 2024-09-06 RX ORDER — SODIUM CHLORIDE, SODIUM LACTATE, POTASSIUM CHLORIDE, CALCIUM CHLORIDE 600; 310; 30; 20 MG/100ML; MG/100ML; MG/100ML; MG/100ML
100 INJECTION, SOLUTION INTRAVENOUS ONCE AS NEEDED
Status: DISCONTINUED | OUTPATIENT
Start: 2024-09-06 | End: 2024-09-06 | Stop reason: HOSPADM

## 2024-09-06 RX ORDER — LIDOCAINE HYDROCHLORIDE 20 MG/ML
INJECTION, SOLUTION EPIDURAL; INFILTRATION; INTRACAUDAL; PERINEURAL AS NEEDED
Status: DISCONTINUED | OUTPATIENT
Start: 2024-09-06 | End: 2024-09-06 | Stop reason: SURG

## 2024-09-06 RX ORDER — FENTANYL CITRATE 50 UG/ML
INJECTION, SOLUTION INTRAMUSCULAR; INTRAVENOUS AS NEEDED
Status: DISCONTINUED | OUTPATIENT
Start: 2024-09-06 | End: 2024-09-06 | Stop reason: SURG

## 2024-09-06 RX ORDER — PHENYLEPHRINE HYDROCHLORIDE 10 MG/ML
INJECTION INTRAVENOUS AS NEEDED
Status: DISCONTINUED | OUTPATIENT
Start: 2024-09-06 | End: 2024-09-06 | Stop reason: SURG

## 2024-09-06 RX ORDER — SODIUM CHLORIDE 9 MG/ML
40 INJECTION, SOLUTION INTRAVENOUS AS NEEDED
Status: DISCONTINUED | OUTPATIENT
Start: 2024-09-06 | End: 2024-09-06 | Stop reason: HOSPADM

## 2024-09-06 RX ORDER — SODIUM CHLORIDE 0.9 % (FLUSH) 0.9 %
10 SYRINGE (ML) INJECTION EVERY 12 HOURS SCHEDULED
Status: DISCONTINUED | OUTPATIENT
Start: 2024-09-06 | End: 2024-09-06 | Stop reason: HOSPADM

## 2024-09-06 RX ORDER — GENTAMICIN SULFATE 80 MG/100ML
80 INJECTION, SOLUTION INTRAVENOUS ONCE
Status: COMPLETED | OUTPATIENT
Start: 2024-09-06 | End: 2024-09-06

## 2024-09-06 RX ORDER — OXYCODONE AND ACETAMINOPHEN 5; 325 MG/1; MG/1
1 TABLET ORAL ONCE AS NEEDED
Status: DISCONTINUED | OUTPATIENT
Start: 2024-09-06 | End: 2024-09-06 | Stop reason: HOSPADM

## 2024-09-06 RX ORDER — PROPOFOL 10 MG/ML
INJECTION, EMULSION INTRAVENOUS AS NEEDED
Status: DISCONTINUED | OUTPATIENT
Start: 2024-09-06 | End: 2024-09-06 | Stop reason: SURG

## 2024-09-06 RX ORDER — HYDROCODONE BITARTRATE AND ACETAMINOPHEN 10; 325 MG/1; MG/1
1 TABLET ORAL EVERY 6 HOURS PRN
Qty: 12 TABLET | Refills: 0 | Status: SHIPPED | OUTPATIENT
Start: 2024-09-06

## 2024-09-06 RX ORDER — FAMOTIDINE 10 MG/ML
INJECTION, SOLUTION INTRAVENOUS AS NEEDED
Status: DISCONTINUED | OUTPATIENT
Start: 2024-09-06 | End: 2024-09-06 | Stop reason: SURG

## 2024-09-06 RX ORDER — ONDANSETRON 2 MG/ML
INJECTION INTRAMUSCULAR; INTRAVENOUS AS NEEDED
Status: DISCONTINUED | OUTPATIENT
Start: 2024-09-06 | End: 2024-09-06 | Stop reason: SURG

## 2024-09-06 RX ORDER — SODIUM CHLORIDE, SODIUM LACTATE, POTASSIUM CHLORIDE, CALCIUM CHLORIDE 600; 310; 30; 20 MG/100ML; MG/100ML; MG/100ML; MG/100ML
125 INJECTION, SOLUTION INTRAVENOUS ONCE
Status: COMPLETED | OUTPATIENT
Start: 2024-09-06 | End: 2024-09-06

## 2024-09-06 RX ORDER — ASPIRIN 81 MG/1
81 TABLET ORAL DAILY
COMMUNITY

## 2024-09-06 RX ORDER — MIDAZOLAM HYDROCHLORIDE 1 MG/ML
0.5 INJECTION INTRAMUSCULAR; INTRAVENOUS
Status: DISCONTINUED | OUTPATIENT
Start: 2024-09-06 | End: 2024-09-06 | Stop reason: HOSPADM

## 2024-09-06 RX ADMIN — ONDANSETRON 4 MG: 2 INJECTION INTRAMUSCULAR; INTRAVENOUS at 10:57

## 2024-09-06 RX ADMIN — EPHEDRINE SULFATE 10 MG: 5 INJECTION INTRAVENOUS at 11:12

## 2024-09-06 RX ADMIN — KETOROLAC TROMETHAMINE 30 MG: 30 INJECTION, SOLUTION INTRAMUSCULAR; INTRAVENOUS at 10:59

## 2024-09-06 RX ADMIN — EPHEDRINE SULFATE 10 MG: 5 INJECTION INTRAVENOUS at 11:07

## 2024-09-06 RX ADMIN — SODIUM CHLORIDE, POTASSIUM CHLORIDE, SODIUM LACTATE AND CALCIUM CHLORIDE 125 ML/HR: 600; 310; 30; 20 INJECTION, SOLUTION INTRAVENOUS at 10:35

## 2024-09-06 RX ADMIN — EPHEDRINE SULFATE 10 MG: 5 INJECTION INTRAVENOUS at 10:58

## 2024-09-06 RX ADMIN — PHENYLEPHRINE HYDROCHLORIDE 100 MCG: 10 INJECTION INTRAVENOUS at 11:14

## 2024-09-06 RX ADMIN — EPHEDRINE SULFATE 10 MG: 5 INJECTION INTRAVENOUS at 10:52

## 2024-09-06 RX ADMIN — FENTANYL CITRATE 100 MCG: 50 INJECTION INTRAMUSCULAR; INTRAVENOUS at 10:49

## 2024-09-06 RX ADMIN — PHENYLEPHRINE HYDROCHLORIDE 100 MCG: 10 INJECTION INTRAVENOUS at 11:00

## 2024-09-06 RX ADMIN — PHENYLEPHRINE HYDROCHLORIDE 100 MCG: 10 INJECTION INTRAVENOUS at 11:06

## 2024-09-06 RX ADMIN — GENTAMICIN SULFATE 80 MG: 80 INJECTION, SOLUTION INTRAVENOUS at 10:44

## 2024-09-06 RX ADMIN — FAMOTIDINE 20 MG: 10 INJECTION, SOLUTION INTRAVENOUS at 10:44

## 2024-09-06 RX ADMIN — LIDOCAINE HYDROCHLORIDE 100 MG: 20 INJECTION, SOLUTION EPIDURAL; INFILTRATION; INTRACAUDAL; PERINEURAL at 10:59

## 2024-09-06 RX ADMIN — PROPOFOL 100 MG: 10 INJECTION, EMULSION INTRAVENOUS at 10:49

## 2024-09-06 RX ADMIN — LIDOCAINE HYDROCHLORIDE 60 MG: 20 INJECTION, SOLUTION EPIDURAL; INFILTRATION; INTRACAUDAL; PERINEURAL at 10:49

## 2024-09-06 RX ADMIN — EPHEDRINE SULFATE 10 MG: 5 INJECTION INTRAVENOUS at 11:01

## 2024-09-06 NOTE — ANESTHESIA PROCEDURE NOTES
Airway  Urgency: elective    Date/Time: 9/6/2024 10:49 AM  Airway not difficult    General Information and Staff    Patient location during procedure: OR  Anesthesiologist: Rony Thomas MD  CRNA/CAA: Libby Ramsay CRNA    Indications and Patient Condition  Indications for airway management: airway protection    Preoxygenated: yes  Mask difficulty assessment: 0 - not attempted    Final Airway Details  Final airway type: supraglottic airway      Successful airway: classic  Size 4     Number of attempts at approach: 1  Assessment: lips, teeth, and gum same as pre-op    Additional Comments  LMA placed with no trauma noted. Patient tolerated well. Good seal. Secured.

## 2024-09-06 NOTE — ANESTHESIA POSTPROCEDURE EVALUATION
Patient: Diallo Chun    Procedure Summary       Date: 09/06/24 Room / Location: [facility] OR 09 /  [facility] OR    Anesthesia Start: 1044 Anesthesia Stop: 1118    Procedure: EXTRACORPOREAL SHOCKWAVE LITHOTRIPSY (Right) Diagnosis:       Gross hematuria      Other microscopic hematuria      History of kidney stones      Right renal stone      (Gross hematuria [R31.0])      (Other microscopic hematuria [R31.29])      (History of kidney stones [Z87.442])      (Right renal stone [N20.0])    Surgeons: Mauro Vora MD Provider: Rony Thomas MD    Anesthesia Type: general ASA Status: 3            Anesthesia Type: general    Vitals  Vitals Value Taken Time   /67 09/06/24 1145   Temp 97.4 °F (36.3 °C) 09/06/24 1120   Pulse 62 09/06/24 1146   Resp 20 09/06/24 1145   SpO2 95 % 09/06/24 1146   Vitals shown include unfiled device data.        Post Anesthesia Care and Evaluation    Patient location during evaluation: bedside  Patient participation: complete - patient participated  Level of consciousness: awake and alert  Pain score: 1  Pain management: adequate    Airway patency: patent  Anesthetic complications: No anesthetic complications  PONV Status: none  Cardiovascular status: acceptable  Respiratory status: acceptable  Hydration status: acceptable

## 2024-09-06 NOTE — OP NOTE
EXTRACORPOREAL SHOCKWAVE LITHOTRIPSY  Procedure Note    Diallo Chun  9/6/2024    Pre-op Diagnosis:   Gross hematuria [R31.0]  Other microscopic hematuria [R31.29]  History of kidney stones [Z87.442]  Right renal stone [N20.0]    Post-op Diagnosis:     Post-Op Diagnosis Codes:     * Gross hematuria [R31.0]     * Other microscopic hematuria [R31.29]     * History of kidney stones [Z87.442]     * Right renal stone [N20.0]    Procedure/CPT® Codes:    74-year-old white male with a right lower pole painful stone for lithotripsy.  ESWL-the patient is a candidate for extracorporeal shockwave lithotripsy.  We discussed the type of stone and the complications associated with the procedure including, but not limited to, pain in the flank, hematoma, spontaneous renal hemorrhage, inadequate fragmentation of stones, the need for passage of the stones, the need for concomitant additional procedures in the range of 24%, the risk of a distal fragment in the range of 3% requiring ureteroscopic removal, and the fact that sometimes a stent is indicated based on the size and the density of the stone as determined on the CAT scan.  Additionally, we discussed percutaneous nephrostolithotomy.  Including the mini PERC.  With its attendant risks of anesthesia bleeding infection and the fact that is a invasive procedure with the remote possibility of a nephrectomy.  We also discussed the use of ureteroscopy which is a rigid or flexible instrument placed up into the kidney to break up stones with the laser beam and very likely a postop stent and a high likelihood of additional concomitant procedures.  Following an informed consent, he was brought to the operative suite and underwent induction of general endotracheal anesthetic.  The stone was localized at F2 and a total of 3000 shockwaves was administered without complication.  There was excellent fragmentation  He was awake and alert and returned to recovery room.        Procedure(s):  EXTRACORPOREAL SHOCKWAVE LITHOTRIPSY    Surgeon(s):  Mauro Vora MD    Anesthesia: see anesthesia record    Staff:   Circulator: Paola Hawkins RN  Scrub Person: Maria Esther Carlos LPN; Rimma Bautista; Negrita Montilla    Estimated Blood Loss: none  Urine Voided: * No values recorded between 9/6/2024 10:45 AM and 9/6/2024 11:10 AM *    Specimens:                None      Drains: None    Findings: Excellent fragmentation     Blood: N/A    Complications: None    Grafts and Implants: None    Mauro Vora MD     Date: 9/6/2024  Time: 11:10 EDT

## 2024-09-06 NOTE — ANESTHESIA PREPROCEDURE EVALUATION
Anesthesia Evaluation     no history of anesthetic complications:   NPO Solid Status: > 8 hours  NPO Liquid Status: > 8 hours           Airway   Mallampati: II  TM distance: >3 FB  Neck ROM: full  No difficulty expected  Dental    (+) upper dentures and partials    Pulmonary - normal exam   (+) ,shortness of breath  Cardiovascular - normal exam    (+) hypertension, CAD, CABG, cardiac stents , hyperlipidemia      Neuro/Psych  GI/Hepatic/Renal/Endo    (+) GERD, renal disease-    Musculoskeletal     Abdominal  - normal exam    Bowel sounds: normal.   Substance History      OB/GYN          Other   arthritis,                 Anesthesia Plan    ASA 3     general     intravenous induction     Anesthetic plan, risks, benefits, and alternatives have been provided, discussed and informed consent has been obtained with: patient.    CODE STATUS:

## 2024-09-24 ENCOUNTER — HOSPITAL ENCOUNTER (OUTPATIENT)
Dept: GENERAL RADIOLOGY | Facility: HOSPITAL | Age: 74
Discharge: HOME | End: 2024-09-24
Admitting: UROLOGY
Payer: MEDICARE

## 2024-09-24 ENCOUNTER — OFFICE VISIT (OUTPATIENT)
Dept: UROLOGY | Facility: CLINIC | Age: 74
End: 2024-09-24
Payer: MEDICARE

## 2024-09-24 VITALS
SYSTOLIC BLOOD PRESSURE: 147 MMHG | BODY MASS INDEX: 25.47 KG/M2 | WEIGHT: 188 LBS | DIASTOLIC BLOOD PRESSURE: 86 MMHG | HEIGHT: 72 IN | HEART RATE: 61 BPM

## 2024-09-24 DIAGNOSIS — R31.0 GROSS HEMATURIA: ICD-10-CM

## 2024-09-24 DIAGNOSIS — N20.0 RIGHT RENAL STONE: ICD-10-CM

## 2024-09-24 DIAGNOSIS — N20.0 RIGHT RENAL STONE: Primary | ICD-10-CM

## 2024-09-24 PROCEDURE — 84153 ASSAY OF PSA TOTAL: CPT | Performed by: UROLOGY

## 2024-09-24 PROCEDURE — 3077F SYST BP >= 140 MM HG: CPT | Performed by: UROLOGY

## 2024-09-24 PROCEDURE — 99024 POSTOP FOLLOW-UP VISIT: CPT | Performed by: UROLOGY

## 2024-09-24 PROCEDURE — 1159F MED LIST DOCD IN RCRD: CPT | Performed by: UROLOGY

## 2024-09-24 PROCEDURE — 74018 RADEX ABDOMEN 1 VIEW: CPT | Performed by: RADIOLOGY

## 2024-09-24 PROCEDURE — 3079F DIAST BP 80-89 MM HG: CPT | Performed by: UROLOGY

## 2024-09-24 PROCEDURE — 74018 RADEX ABDOMEN 1 VIEW: CPT

## 2024-09-24 PROCEDURE — 1160F RVW MEDS BY RX/DR IN RCRD: CPT | Performed by: UROLOGY

## 2024-09-24 NOTE — PROGRESS NOTES
Chief Complaint:      Chief Complaint   Patient presents with    Post-op     ESWL       HPI:   74 y.o. male that is post a lithotripsy had some right flank pain PSA is 4.3 had a right 4 mm distal stone with some urgency I am going to repeat his PSA he has a high likelihood of spontaneous passage I will see him back in 3 weeks    Past Medical History:     Past Medical History:   Diagnosis Date    Arthritis     Blockage of coronary artery of heart 2003    Elevated cholesterol     GERD (gastroesophageal reflux disease)     History of transfusion     Hyperlipidemia     Hypertension     Kidney stone     Leaky heart valve     Machinery accident     Right arm affected       Current Meds:     Current Outpatient Medications   Medication Sig Dispense Refill    amLODIPine (NORVASC) 5 MG tablet Take 2 tablets by mouth Daily. 90 tablet 1    aspirin 81 MG EC tablet Take 1 tablet by mouth Daily.      atorvastatin (LIPITOR) 40 MG tablet Take 1 tablet by mouth Daily. 90 tablet 1    HYDROcodone-acetaminophen (NORCO)  MG per tablet Take 1 tablet by mouth Every 6 (Six) Hours As Needed for Moderate Pain (Pain). 12 tablet 0    ketorolac (TORADOL) 10 MG tablet Take 1 tablet by mouth Every 6 (Six) Hours As Needed for Moderate Pain. 20 tablet 0    lisinopril (PRINIVIL,ZESTRIL) 40 MG tablet Take 1 tablet by mouth Daily. 90 tablet 1    metoprolol succinate XL (TOPROL-XL) 100 MG 24 hr tablet Take 1 tablet by mouth Daily. 90 tablet 1    ondansetron (Zofran) 4 MG tablet Take 1 tablet by mouth Every 12 (Twelve) Hours As Needed for Nausea.      tamsulosin (FLOMAX) 0.4 MG capsule 24 hr capsule Take 1 capsule by mouth Daily.       No current facility-administered medications for this visit.        Allergies:      Allergies   Allergen Reactions    Bee Venom Other (See Comments)     SWELLING        Past Surgical History:     Past Surgical History:   Procedure Laterality Date    CARDIAC CATHETERIZATION      x1 stent    CARDIAC SURGERY      5  bypass    CYSTOSCOPY RETROGRADE PYELOGRAM Right 2022    Procedure: CYSTOSCOPY RETROGRADE PYELOGRAM;  Surgeon: Letty Flores MD;  Location: [facility];  Service: Urology;  Laterality: Right;    EXTRACORPOREAL SHOCK WAVE LITHOTRIPSY (ESWL) Right 2024    Procedure: EXTRACORPOREAL SHOCKWAVE LITHOTRIPSY;  Surgeon: Mauro Vora MD;  Location: [facility] OR;  Service: Urology;  Laterality: Right;    FRACTURE SURGERY      SKIN GRAFT Right     URETEROSCOPY LASER LITHOTRIPSY WITH STENT INSERTION Right 2022    Procedure: URETEROSCOPY LASER LITHOTRIPSY;  Surgeon: Letty Flores MD;  Location: [facility];  Service: Urology;  Laterality: Right;       Social History:     Social History     Socioeconomic History    Marital status:    Tobacco Use    Smoking status: Former     Current packs/day: 0.00     Types: Cigarettes     Quit date:      Years since quittin.7    Smokeless tobacco: Never   Vaping Use    Vaping status: Never Used   Substance and Sexual Activity    Alcohol use: Never    Drug use: Never    Sexual activity: Defer       Family History:     Family History   Problem Relation Age of Onset    Diabetes Father        Review of Systems:     Review of Systems   Constitutional: Negative.    HENT: Negative.     Eyes: Negative.    Respiratory: Negative.     Cardiovascular: Negative.    Gastrointestinal: Negative.    Endocrine: Negative.    Musculoskeletal: Negative.    Allergic/Immunologic: Negative.    Neurological: Negative.    Hematological: Negative.    Psychiatric/Behavioral: Negative.         Physical Exam:     Physical Exam  Vitals and nursing note reviewed.   Constitutional:       Appearance: He is well-developed.   HENT:      Head: Normocephalic and atraumatic.   Eyes:      Conjunctiva/sclera: Conjunctivae normal.      Pupils: Pupils are equal, round, and reactive to light.   Cardiovascular:      Rate and Rhythm: Normal rate and regular rhythm.      Heart sounds: Normal heart sounds.    Pulmonary:      Effort: Pulmonary effort is normal.      Breath sounds: Normal breath sounds.   Abdominal:      General: Bowel sounds are normal.      Palpations: Abdomen is soft.   Musculoskeletal:         General: Normal range of motion.      Cervical back: Normal range of motion.   Skin:     General: Skin is warm and dry.   Neurological:      Mental Status: He is alert and oriented to person, place, and time.      Deep Tendon Reflexes: Reflexes are normal and symmetric.   Psychiatric:         Behavior: Behavior normal.         Thought Content: Thought content normal.         Judgment: Judgment normal.         I have reviewed the following portions of the patient's history: Allergies, current medications, past family history, past medical history, past social history, past surgical history, problem list, and ROS and confirm it is accurate.    Recent Image (CT and/or KUB):      CT Abdomen and Pelvis: No results found for this or any previous visit.       CT Stone Protocol: No results found for this or any previous visit.       KUB: Results for orders placed during the hospital encounter of 08/30/24    XR Abdomen KUB    Narrative  PROCEDURE: XR ABDOMEN KUB-    HISTORY: kidney stone; R31.0-Gross hematuria    COMPARISON: None.    FINDINGS: An AP view of the abdomen and pelvis demonstrates an  unremarkable bowel gas pattern with no evidence of obstruction. There is  a 5 mm calcification overlying the right renal shadow consistent with a  stone. Phleboliths are present in the pelvis. Degenerative changes are  noted in the lumbar spine.    Impression  Right nephrolithiasis.          This report was finalized on 8/30/2024 3:53 PM by Mara Childs M.D..       Labs (past 3 months):      Pre-Admission Testing on 09/04/2024   Component Date Value Ref Range Status    QT Interval 09/04/2024 460  ms Final    QTC Interval 09/04/2024 466  ms Final   Office Visit on 08/30/2024   Component Date Value Ref Range Status     Color 08/30/2024 Yellow  Yellow, Straw, Dark Yellow, Karen Final    Clarity, UA 08/30/2024 Clear  Clear Final    Specific Gravity  08/30/2024 1.005  1.005 - 1.030 Final    pH, Urine 08/30/2024 6.0  5.0 - 8.0 Final    Leukocytes 08/30/2024 Negative  Negative Final    Nitrite, UA 08/30/2024 Negative  Negative Final    Protein, POC 08/30/2024 Negative  Negative mg/dL Final    Glucose, UA 08/30/2024 Negative  Negative mg/dL Final    Ketones, UA 08/30/2024 Negative  Negative Final    Urobilinogen, UA 08/30/2024 Normal  Normal, 0.2 E.U./dL Final    Bilirubin 08/30/2024 Negative  Negative Final    Blood, UA 08/30/2024 3+ (A)  Negative Final    Lot Number 08/30/2024 [id number]   Final    Expiration Date 08/30/2024 102,024   Final    Urine Culture 08/30/2024 No growth   Final        Procedure:       Assessment/Plan:   Ureteral calculus-patient has been diagnosed with a ureteral calculus.  We have discussed the various parameters regarding spontaneous passage including the notion that a 2 mm stone has a high likelihood of spontaneous passage versus a larger stone being caught up in the upper areas of the urinary tract.  We also discussed the medical management of stone disease and the use of medical expulsive therapy in the form of Flomax.  This is used in an off label setting.  We discussed the indicators for intervention including  absolute indicators such as sepsis and uncontrollable severe pain, as well as the relative indicators of moderate pain that is well-controlled with various analgesia.  I also talked about nonoperative management including ambulation and increasing fluids and hot tub as being an effective adjuncts in the treatment of a ureteral stone.  PSA testing-I am recommending a PSA blood test that stands for prostate specific antigen.  I discussed the pathophysiology of PSA testing indicating its use in the diagnosis and management of prostate cancer.  I discussed the normal range being 0 to 4, but more  appropriately being much closer to 0 to 2 in a normal male.  I discussed the fact that after a certain age we don't recommend PSA testing especially in view of numerous comorbidities, that this will not be a useful test.  I discussed many of the things that can artificially raise PSA including a recent infection, urinary tract infection, and recent sexual intercourse, or even the type of movement such as manipulation of the prostate from riding a bicycle.  After all this is taken into account when the test is reviewed, the most important use of PSA is the velocity measurement.  In other words, the change of PSA with time is a very important factor in the use and that we look for greater than 20% rise over a year to help us make the prediction of prostate cancer.  I also discussed that the use with prostate cancer indicating that after a radical prostatectomy, the PSA should be 0 and any rise indicates an early biochemical recurrence.            This document has been electronically signed by FERMIN COX MD September 24, 2024 13:13 EDT    Dictated Utilizing Dragon Dictation: Part of this note may be an electronic transcription/translation of spoken language to printed text using the Dragon Dictation System.

## 2024-09-25 LAB — PSA SERPL-MCNC: 2.82 NG/ML (ref 0–4)

## 2024-10-17 DIAGNOSIS — N20.0 RIGHT RENAL STONE: Primary | ICD-10-CM

## 2024-10-18 ENCOUNTER — TELEPHONE (OUTPATIENT)
Dept: UROLOGY | Facility: CLINIC | Age: 74
End: 2024-10-18
Payer: MEDICARE

## 2024-10-18 NOTE — TELEPHONE ENCOUNTER
Called patients wife back to let her know that  Dr Vora just wanted to make sure the kidney stones hasn't moved. Patients verbalized understanding.

## 2024-10-18 NOTE — TELEPHONE ENCOUNTER
Provider: DR. COX    Caller: Lucrecia Chun    Relationship to Patient: Emergency Contact     Phone Number: [phone]    Reason for Call: KUB XRAY    When was the patient last seen: 09/24/24    Notes: MRS. CHUN WOULD LIKE A CALL TO EXPLAIN WHY PATIENT HAS TO HAVE ANOTHER KUB XRAY. PATIENT HAD A KUB ON 09/24/24.    PLEASE CALL PATIENT TO DISCUSS.

## 2024-10-22 ENCOUNTER — HOSPITAL ENCOUNTER (OUTPATIENT)
Dept: GENERAL RADIOLOGY | Facility: HOSPITAL | Age: 74
Discharge: HOME | End: 2024-10-22
Admitting: UROLOGY
Payer: MEDICARE

## 2024-10-22 ENCOUNTER — OFFICE VISIT (OUTPATIENT)
Dept: UROLOGY | Facility: CLINIC | Age: 74
End: 2024-10-22
Payer: MEDICARE

## 2024-10-22 VITALS
DIASTOLIC BLOOD PRESSURE: 81 MMHG | WEIGHT: 187.2 LBS | BODY MASS INDEX: 25.35 KG/M2 | HEART RATE: 64 BPM | SYSTOLIC BLOOD PRESSURE: 126 MMHG | HEIGHT: 72 IN

## 2024-10-22 DIAGNOSIS — N20.0 RIGHT RENAL STONE: ICD-10-CM

## 2024-10-22 DIAGNOSIS — N20.0 RIGHT RENAL STONE: Primary | ICD-10-CM

## 2024-10-22 PROCEDURE — 1159F MED LIST DOCD IN RCRD: CPT | Performed by: UROLOGY

## 2024-10-22 PROCEDURE — 74018 RADEX ABDOMEN 1 VIEW: CPT | Performed by: RADIOLOGY

## 2024-10-22 PROCEDURE — 1160F RVW MEDS BY RX/DR IN RCRD: CPT | Performed by: UROLOGY

## 2024-10-22 PROCEDURE — 3074F SYST BP LT 130 MM HG: CPT | Performed by: UROLOGY

## 2024-10-22 PROCEDURE — 99024 POSTOP FOLLOW-UP VISIT: CPT | Performed by: UROLOGY

## 2024-10-22 PROCEDURE — 74018 RADEX ABDOMEN 1 VIEW: CPT

## 2024-10-22 PROCEDURE — 3079F DIAST BP 80-89 MM HG: CPT | Performed by: UROLOGY

## 2024-10-22 NOTE — PROGRESS NOTES
Chief Complaint:      Chief Complaint   Patient presents with    Post-op       HPI:   74 y.o. male is post an unremarkable lithotripsy KUB is negative he is completely asymptomatic we discussed stone prevention.    Past Medical History:     Past Medical History:   Diagnosis Date    Arthritis     Blockage of coronary artery of heart 2003    Elevated cholesterol     GERD (gastroesophageal reflux disease)     History of transfusion     Hyperlipidemia     Hypertension     Kidney stone     Leaky heart valve     Machinery accident     Right arm affected       Current Meds:     Current Outpatient Medications   Medication Sig Dispense Refill    amLODIPine (NORVASC) 5 MG tablet Take 2 tablets by mouth Daily. 90 tablet 1    aspirin 81 MG EC tablet Take 1 tablet by mouth Daily.      atorvastatin (LIPITOR) 40 MG tablet Take 1 tablet by mouth Daily. 90 tablet 1    HYDROcodone-acetaminophen (NORCO)  MG per tablet Take 1 tablet by mouth Every 6 (Six) Hours As Needed for Moderate Pain (Pain). 12 tablet 0    ketorolac (TORADOL) 10 MG tablet Take 1 tablet by mouth Every 6 (Six) Hours As Needed for Moderate Pain. 20 tablet 0    lisinopril (PRINIVIL,ZESTRIL) 40 MG tablet Take 1 tablet by mouth Daily. 90 tablet 1    metoprolol succinate XL (TOPROL-XL) 100 MG 24 hr tablet Take 1 tablet by mouth Daily. 90 tablet 1    ondansetron (Zofran) 4 MG tablet Take 1 tablet by mouth Every 12 (Twelve) Hours As Needed for Nausea.      tamsulosin (FLOMAX) 0.4 MG capsule 24 hr capsule Take 1 capsule by mouth Daily.       No current facility-administered medications for this visit.        Allergies:      Allergies   Allergen Reactions    Bee Venom Other (See Comments)     SWELLING        Past Surgical History:     Past Surgical History:   Procedure Laterality Date    CARDIAC CATHETERIZATION      x1 stent    CARDIAC SURGERY      5 bypass    CYSTOSCOPY RETROGRADE PYELOGRAM Right 07/07/2022    Procedure: CYSTOSCOPY RETROGRADE PYELOGRAM;  Surgeon:  Letty Flores MD;  Location: [facility];  Service: Urology;  Laterality: Right;    EXTRACORPOREAL SHOCK WAVE LITHOTRIPSY (ESWL) Right 2024    Procedure: EXTRACORPOREAL SHOCKWAVE LITHOTRIPSY;  Surgeon: Mauro Vora MD;  Location: [facility];  Service: Urology;  Laterality: Right;    FRACTURE SURGERY      SKIN GRAFT Right     URETEROSCOPY LASER LITHOTRIPSY WITH STENT INSERTION Right 2022    Procedure: URETEROSCOPY LASER LITHOTRIPSY;  Surgeon: Letty Flores MD;  Location: [facility];  Service: Urology;  Laterality: Right;       Social History:     Social History     Socioeconomic History    Marital status:    Tobacco Use    Smoking status: Former     Current packs/day: 0.00     Types: Cigarettes     Quit date:      Years since quittin.    Smokeless tobacco: Never   Vaping Use    Vaping status: Never Used   Substance and Sexual Activity    Alcohol use: Never    Drug use: Never    Sexual activity: Defer       Family History:     Family History   Problem Relation Age of Onset    Diabetes Father        Review of Systems:     Review of Systems   Constitutional: Negative.    HENT: Negative.     Eyes: Negative.    Respiratory: Negative.     Cardiovascular: Negative.    Gastrointestinal: Negative.    Endocrine: Negative.    Musculoskeletal: Negative.    Allergic/Immunologic: Negative.    Neurological: Negative.    Hematological: Negative.    Psychiatric/Behavioral: Negative.         Physical Exam:     Physical Exam  Vitals and nursing note reviewed.   Constitutional:       Appearance: He is well-developed.   HENT:      Head: Normocephalic and atraumatic.   Eyes:      Conjunctiva/sclera: Conjunctivae normal.      Pupils: Pupils are equal, round, and reactive to light.   Cardiovascular:      Rate and Rhythm: Normal rate and regular rhythm.      Heart sounds: Normal heart sounds.   Pulmonary:      Effort: Pulmonary effort is normal.      Breath sounds: Normal breath sounds.   Abdominal:       General: Bowel sounds are normal.      Palpations: Abdomen is soft.   Musculoskeletal:         General: Normal range of motion.      Cervical back: Normal range of motion.   Skin:     General: Skin is warm and dry.   Neurological:      Mental Status: He is alert and oriented to person, place, and time.      Deep Tendon Reflexes: Reflexes are normal and symmetric.   Psychiatric:         Behavior: Behavior normal.         Thought Content: Thought content normal.         Judgment: Judgment normal.         I have reviewed the following portions of the patient's history: Allergies, current medications, past family history, past medical history, past social history, past surgical history, problem list, and ROS and confirm it is accurate.    Recent Image (CT and/or KUB):      CT Abdomen and Pelvis: No results found for this or any previous visit.       CT Stone Protocol: No results found for this or any previous visit.       KUB: Results for orders placed during the hospital encounter of 10/22/24    XR Abdomen KUB    Narrative  EXAMINATION: XR ABDOMEN KUB-    CLINICAL INDICATION: FLANK PAIN; N20.0-Calculus of kidney      COMPARISON: 9/24/2024    FINDINGS:  1. View of the abdomen    Moderate stool    Arthritic change in the spine      This report was finalized on 10/22/2024 10:31 AM by Dr. Van Barnard MD.       Labs (past 3 months):      Office Visit on 09/24/2024   Component Date Value Ref Range Status    PSA 09/24/2024 2.820  0.000 - 4.000 ng/mL Final   Pre-Admission Testing on 09/04/2024   Component Date Value Ref Range Status    QT Interval 09/04/2024 460  ms Final    QTC Interval 09/04/2024 466  ms Final   Office Visit on 08/30/2024   Component Date Value Ref Range Status    Color 08/30/2024 Yellow  Yellow, Straw, Dark Yellow, Karen Final    Clarity, UA 08/30/2024 Clear  Clear Final    Specific Gravity  08/30/2024 1.005  1.005 - 1.030 Final    pH, Urine 08/30/2024 6.0  5.0 - 8.0 Final    Leukocytes 08/30/2024  Negative  Negative Final    Nitrite, UA 08/30/2024 Negative  Negative Final    Protein, POC 08/30/2024 Negative  Negative mg/dL Final    Glucose, UA 08/30/2024 Negative  Negative mg/dL Final    Ketones, UA 08/30/2024 Negative  Negative Final    Urobilinogen, UA 08/30/2024 Normal  Normal, 0.2 E.U./dL Final    Bilirubin 08/30/2024 Negative  Negative Final    Blood, UA 08/30/2024 3+ (A)  Negative Final    Lot Number 08/30/2024 [id number]   Final    Expiration Date 08/30/2024 102,024   Final    Urine Culture 08/30/2024 No growth   Final        Procedure:       Assessment/Plan:   Renal calculus-we discussed the presence of the stone. We discussed the various therapeutic options available including percutaneous nephrostolithotomy, ureteroscopy and extracorporeal shockwave  lithotripsy.  We discussed the risks of lithotripsy including the passage of stones, the development of a large string of stones in the distal ureter known as Steinstrasse.  In the 3% incidence of that we will need to proceed with a ureteroscopy for obstructing fragments.  Extremely rare incidence of renal hematoma and the significance of this.  We discussed percutaneous nephrostolithotomy and its use as well as the risks and benefits such as the need for postoperative hospitalization, and the risk of damage to the kidney and the remote risk of a nephrectomy.  We also discussed the use of ureteroscopy in the upper tracts.  That this is as a decreased success rate to completely remove the stones on the first option and that very likely a stent will be required requiring an additional procedure for removal.  We discussed the absolute relative indicators for intervention including the presence of sepsis and pain we cannot control ais the primary need for urgent intervention.  We discussed placement of a stent if indicated and the management of the stent as well.  Metabolic stone disease-discussed her stone analysis, discussed work-up including a  24-hour Litholink where indicated.  Discussed medical therapy including thiazide and Urocit-K that are specific to specific types of stones, discussed increasing fluids and avoidance of cola products and anything containing high amounts of oxalates.              This document has been electronically signed by FERMIN COX MD October 22, 2024 10:53 EDT    Dictated Utilizing Dragon Dictation: Part of this note may be an electronic transcription/translation of spoken language to printed text using the Dragon Dictation System.

## (undated) DEVICE — NITINOL STONE RETRIEVAL BASKET: Brand: ZERO TIP

## (undated) DEVICE — GW ZIPWIRE STD/SHFT STR TPR/3CM .035IN 150CM

## (undated) DEVICE — COR CYSTO: Brand: MEDLINE INDUSTRIES, INC.

## (undated) DEVICE — FLEXIVA  PULSE  AND  FLEXIVA  PULSE  TRACTIP  LASER  FIBERS  ARE  HIGH  POWER  SINGLE-USE FIBER: Brand: FLEXIVA PULSE

## (undated) DEVICE — DIL URETH AQ LNG/TPR 6F 12X60CM